# Patient Record
Sex: MALE | Race: WHITE | NOT HISPANIC OR LATINO | Employment: FULL TIME | URBAN - NONMETROPOLITAN AREA
[De-identification: names, ages, dates, MRNs, and addresses within clinical notes are randomized per-mention and may not be internally consistent; named-entity substitution may affect disease eponyms.]

---

## 2020-01-06 ENCOUNTER — HOSPITAL ENCOUNTER (EMERGENCY)
Facility: HOSPITAL | Age: 60
Discharge: HOME | End: 2020-01-06
Attending: NURSE PRACTITIONER | Admitting: NURSE PRACTITIONER
Payer: COMMERCIAL

## 2020-01-06 VITALS
SYSTOLIC BLOOD PRESSURE: 131 MMHG | DIASTOLIC BLOOD PRESSURE: 85 MMHG | TEMPERATURE: 96.5 F | RESPIRATION RATE: 14 BRPM | OXYGEN SATURATION: 96 %

## 2020-01-06 DIAGNOSIS — R21 RASH AND NONSPECIFIC SKIN ERUPTION: ICD-10-CM

## 2020-01-06 PROCEDURE — 99203 OFFICE O/P NEW LOW 30 MIN: CPT | Mod: Z6 | Performed by: NURSE PRACTITIONER

## 2020-01-06 PROCEDURE — G0463 HOSPITAL OUTPT CLINIC VISIT: HCPCS

## 2020-01-06 RX ORDER — PREDNISONE 20 MG/1
TABLET ORAL
Qty: 5 TABLET | Refills: 0 | Status: SHIPPED | OUTPATIENT
Start: 2020-01-06 | End: 2020-01-22

## 2020-01-06 ASSESSMENT — ENCOUNTER SYMPTOMS
ACTIVITY CHANGE: 1
GASTROINTESTINAL NEGATIVE: 1
NEUROLOGICAL NEGATIVE: 1
COLOR CHANGE: 1
FEVER: 0
RESPIRATORY NEGATIVE: 1
CHILLS: 0

## 2020-01-06 NOTE — DISCHARGE INSTRUCTIONS
Start taking loratadine (Claritan) 10 mg twice a day for ten days. You can hold the loratadine at night and take diphenhydramine (Benadryl 25 to 50 mg).   Hydrocortisone cream twice daily. Apply in a thin layer.  Return to be reevaluated if the rash worsens in the next 24 to 48 hours or if you develop fevers. Or chills

## 2020-01-06 NOTE — ED TRIAGE NOTES
"Pt is here today with c/o rash on \" all over body\" when I get home and warm up it flares up. When im outside and stay cool \"its not bad\". When it flares up it gets white bumps and becomes itchy. Pt reports trying Vaseline and cortisone and baby lotion with no relief.   "

## 2020-01-06 NOTE — ED PROVIDER NOTES
History     Chief Complaint   Patient presents with     Rash     c/o itchy rash     HPI  Alber Jernigan is a 59 year old male who presents with a itchy rash covering his arms, legs, chest, and back that started one week ago , patches, itching. This has been accompanied with episode of hot flashes. The rash prevents him from sleeping. He has been applying Vaseline and baby lotion to the rash with minimal relief form the discomfort. Denies fevers, chills, nausea, vomiting, diarrhea, and shortness of breath.    Accompanied per self  Length of illness one week  History of prior none  Medications or interventions for discomfort vaseline and baby lotion  Last dose of OTC none  Sick contacts none    Allergies:  Not on File    Problem List:    There are no active problems to display for this patient.       Past Medical History:    History reviewed. No pertinent past medical history.    Past Surgical History:    History reviewed. No pertinent surgical history.    Family History:    No family history on file.    Social History:  Marital Status:   [2]  Social History     Tobacco Use     Smoking status: None   Substance Use Topics     Alcohol use: None     Drug use: None        Medications:    predniSONE (DELTASONE) 20 MG tablet          Review of Systems   Constitutional: Positive for activity change. Negative for chills and fever.        Having episodes  Of hot flashes   Respiratory: Negative.    Gastrointestinal: Negative.    Skin: Positive for color change and rash.   Neurological: Negative.        Physical Exam   BP: 131/85  Heart Rate: 76  Temp: 96.5  F (35.8  C)  Resp: 14  SpO2: 96 %      Physical Exam  Vitals signs and nursing note reviewed.   Constitutional:       General: He is in acute distress.      Appearance: He is normal weight.   HENT:      Head: Normocephalic.   Cardiovascular:      Rate and Rhythm: Normal rate.   Pulmonary:      Effort: Pulmonary effort is normal.   Skin:     General: Skin is  warm and dry.      Findings: Erythema (mild erythema and blotchy.) present. No rash.   Neurological:      Mental Status: He is alert and oriented to person, place, and time.   Psychiatric:         Behavior: Behavior normal.         ED Course        Procedures               No results found for this or any previous visit (from the past 24 hour(s)).    Medications - No data to display    Assessments & Plan (with Medical Decision Making)     I have reviewed the nursing notes.    I have reviewed the findings, diagnosis, plan and need for follow up with the patient.  (R21) Rash and nonspecific skin eruption    Comment: 59 year old male who presents with discomfort from a skin rash that covers his body including, his arms, legs, chest, abdomen and back. The rash is preventing him from sleeping. Denies any new hygiene products, soaps, cleaning supplies, car, bedding, clothes, and furniture. Has not started any new medications. At the time of this exam, the rash was not present but his skin tone in mildly erythematous and blotchy/.  The rash bothers him mostly at night.    Plan: decreasing dose of prednisone prescribed and education concerning this medication provided. Start taking loratadine (Claritan) 10 mg (education provided) twice a day for ten days. You can hold the loratadine at night and take diphenhydramine (Benadryl 25 to 50 mg).   Hydrocortisone cream twice daily. Apply in a thin layer.  Be aware of foods that might be causing the rash.  Return to be reevaluated if the rash worsens in the next 24 to 48 hours or if you develop fevers or chills   These discharge instructions and medications were reviewed with him and understanding verbalized.            Discharge Medication List as of 1/6/2020 12:06 PM      START taking these medications    Details   predniSONE (DELTASONE) 20 MG tablet 1 tab daily for 3 days, then 1/2 tab daily for 4 days, Disp-5 tablet, R-0, E-Prescribe             Final diagnoses:   Rash and  nonspecific skin eruption       1/6/2020   [facility]       Marycarmen Medina, CNP  01/08/20 1541

## 2020-01-06 NOTE — ED AVS SNAPSHOT
[facility]  [address]  [place] [address]  Phone:  [phone]                                    Alber Jernigan   MRN: [id number]    Department:  [facility]   Date of Visit:  1/6/2020           After Visit Summary Signature Page    I have received my discharge instructions, and my questions have been answered. I have discussed any challenges I see with this plan with the nurse or doctor.    ..........................................................................................................................................  Patient/Patient Representative Signature      ..........................................................................................................................................  Patient Representative Print Name and Relationship to Patient    ..................................................               ................................................  Date                                   Time    ..........................................................................................................................................  Reviewed by Signature/Title    ...................................................              ..............................................  Date                                               Time          22EPIC Rev 08/18

## 2020-01-22 ENCOUNTER — APPOINTMENT (OUTPATIENT)
Dept: GENERAL RADIOLOGY | Facility: HOSPITAL | Age: 60
DRG: 914 | End: 2020-01-22
Attending: SURGERY
Payer: OTHER MISCELLANEOUS

## 2020-01-22 ENCOUNTER — APPOINTMENT (OUTPATIENT)
Dept: CT IMAGING | Facility: HOSPITAL | Age: 60
DRG: 914 | End: 2020-01-22
Attending: SURGERY
Payer: OTHER MISCELLANEOUS

## 2020-01-22 ENCOUNTER — APPOINTMENT (OUTPATIENT)
Dept: GENERAL RADIOLOGY | Facility: HOSPITAL | Age: 60
DRG: 914 | End: 2020-01-22
Attending: FAMILY MEDICINE
Payer: OTHER MISCELLANEOUS

## 2020-01-22 ENCOUNTER — HOSPITAL ENCOUNTER (INPATIENT)
Facility: HOSPITAL | Age: 60
LOS: 1 days | Discharge: HOME | DRG: 914 | End: 2020-01-23
Attending: FAMILY MEDICINE | Admitting: SURGERY
Payer: OTHER MISCELLANEOUS

## 2020-01-22 ENCOUNTER — APPOINTMENT (OUTPATIENT)
Dept: CT IMAGING | Facility: HOSPITAL | Age: 60
DRG: 914 | End: 2020-01-22
Attending: FAMILY MEDICINE
Payer: OTHER MISCELLANEOUS

## 2020-01-22 DIAGNOSIS — S87.81XA: Primary | ICD-10-CM

## 2020-01-22 DIAGNOSIS — T14.90XA TRAUMA: ICD-10-CM

## 2020-01-22 DIAGNOSIS — S87.81XA: ICD-10-CM

## 2020-01-22 LAB
ALBUMIN SERPL-MCNC: 3.6 G/DL (ref 3.4–5)
ALBUMIN UR-MCNC: NEGATIVE MG/DL
ALP SERPL-CCNC: 52 U/L (ref 40–150)
ALT SERPL W P-5'-P-CCNC: 37 U/L (ref 0–70)
ANION GAP SERPL CALCULATED.3IONS-SCNC: 6 MMOL/L (ref 3–14)
APPEARANCE UR: CLEAR
AST SERPL W P-5'-P-CCNC: 21 U/L (ref 0–45)
BASOPHILS # BLD AUTO: 0.1 10E9/L (ref 0–0.2)
BASOPHILS NFR BLD AUTO: 1.1 %
BILIRUB SERPL-MCNC: 0.2 MG/DL (ref 0.2–1.3)
BILIRUB UR QL STRIP: NEGATIVE
BUN SERPL-MCNC: 24 MG/DL (ref 7–30)
CALCIUM SERPL-MCNC: 8.4 MG/DL (ref 8.5–10.1)
CHLORIDE SERPL-SCNC: 107 MMOL/L (ref 94–109)
CK SERPL-CCNC: 241 U/L (ref 30–300)
CO2 SERPL-SCNC: 25 MMOL/L (ref 20–32)
COLOR UR AUTO: ABNORMAL
CREAT SERPL-MCNC: 1 MG/DL (ref 0.66–1.25)
DIFFERENTIAL METHOD BLD: NORMAL
EOSINOPHIL # BLD AUTO: 0.4 10E9/L (ref 0–0.7)
EOSINOPHIL NFR BLD AUTO: 4.3 %
ERYTHROCYTE [DISTWIDTH] IN BLOOD BY AUTOMATED COUNT: 13.1 % (ref 10–15)
GFR SERPL CREATININE-BSD FRML MDRD: 83 ML/MIN/{1.73_M2}
GLUCOSE BLDC GLUCOMTR-MCNC: 92 MG/DL (ref 70–99)
GLUCOSE SERPL-MCNC: 97 MG/DL (ref 70–99)
GLUCOSE UR STRIP-MCNC: NEGATIVE MG/DL
HCT VFR BLD AUTO: 40.8 % (ref 40–53)
HGB BLD-MCNC: 13.8 G/DL (ref 13.3–17.7)
HGB UR QL STRIP: NEGATIVE
IMM GRANULOCYTES # BLD: 0 10E9/L (ref 0–0.4)
IMM GRANULOCYTES NFR BLD: 0.2 %
INR PPP: 1.14 (ref 0.86–1.14)
KETONES UR STRIP-MCNC: NEGATIVE MG/DL
LEUKOCYTE ESTERASE UR QL STRIP: NEGATIVE
LYMPHOCYTES # BLD AUTO: 2.7 10E9/L (ref 0.8–5.3)
LYMPHOCYTES NFR BLD AUTO: 32 %
MCH RBC QN AUTO: 31.2 PG (ref 26.5–33)
MCHC RBC AUTO-ENTMCNC: 33.8 G/DL (ref 31.5–36.5)
MCV RBC AUTO: 92 FL (ref 78–100)
MONOCYTES # BLD AUTO: 0.6 10E9/L (ref 0–1.3)
MONOCYTES NFR BLD AUTO: 7.4 %
NEUTROPHILS # BLD AUTO: 4.6 10E9/L (ref 1.6–8.3)
NEUTROPHILS NFR BLD AUTO: 55 %
NITRATE UR QL: NEGATIVE
NRBC # BLD AUTO: 0 10*3/UL
NRBC BLD AUTO-RTO: 0 /100
PH UR STRIP: 7.5 PH (ref 4.7–8)
PLATELET # BLD AUTO: 285 10E9/L (ref 150–450)
POTASSIUM SERPL-SCNC: 4.4 MMOL/L (ref 3.4–5.3)
PROT SERPL-MCNC: 6.8 G/DL (ref 6.8–8.8)
RBC # BLD AUTO: 4.42 10E12/L (ref 4.4–5.9)
SODIUM SERPL-SCNC: 138 MMOL/L (ref 133–144)
SOURCE: ABNORMAL
SP GR UR STRIP: 1.04 (ref 1–1.03)
UROBILINOGEN UR STRIP-MCNC: NORMAL MG/DL (ref 0–2)
WBC # BLD AUTO: 8.3 10E9/L (ref 4–11)

## 2020-01-22 PROCEDURE — 36415 COLL VENOUS BLD VENIPUNCTURE: CPT | Performed by: SURGERY

## 2020-01-22 PROCEDURE — 73701 CT LOWER EXTREMITY W/DYE: CPT | Mod: TC,RT

## 2020-01-22 PROCEDURE — 73590 X-RAY EXAM OF LOWER LEG: CPT | Mod: TC,LT

## 2020-01-22 PROCEDURE — 99223 1ST HOSP IP/OBS HIGH 75: CPT | Performed by: SURGERY

## 2020-01-22 PROCEDURE — 73560 X-RAY EXAM OF KNEE 1 OR 2: CPT | Mod: TC,LT

## 2020-01-22 PROCEDURE — 71045 X-RAY EXAM CHEST 1 VIEW: CPT | Mod: TC

## 2020-01-22 PROCEDURE — 80053 COMPREHEN METABOLIC PANEL: CPT | Performed by: SURGERY

## 2020-01-22 PROCEDURE — 40000275 ZZH STATISTIC RCP TIME EA 10 MIN

## 2020-01-22 PROCEDURE — 73110 X-RAY EXAM OF WRIST: CPT | Mod: TC,RT

## 2020-01-22 PROCEDURE — 72193 CT PELVIS W/DYE: CPT | Mod: TC

## 2020-01-22 PROCEDURE — 73130 X-RAY EXAM OF HAND: CPT | Mod: TC,RT

## 2020-01-22 PROCEDURE — 81003 URINALYSIS AUTO W/O SCOPE: CPT | Performed by: SURGERY

## 2020-01-22 PROCEDURE — 25500064 ZZH RX 255 OP 636: Performed by: RADIOLOGY

## 2020-01-22 PROCEDURE — 73552 X-RAY EXAM OF FEMUR 2/>: CPT | Mod: TC,LT

## 2020-01-22 PROCEDURE — 00000146 ZZHCL STATISTIC GLUCOSE BY METER IP

## 2020-01-22 PROCEDURE — 99291 CRITICAL CARE FIRST HOUR: CPT | Mod: 25

## 2020-01-22 PROCEDURE — 12000000 ZZH R&B MED SURG/OB

## 2020-01-22 PROCEDURE — G0390 TRAUMA RESPONS W/HOSP CRITI: HCPCS

## 2020-01-22 PROCEDURE — 85610 PROTHROMBIN TIME: CPT | Performed by: SURGERY

## 2020-01-22 PROCEDURE — 73701 CT LOWER EXTREMITY W/DYE: CPT | Mod: TC,RT,XS

## 2020-01-22 PROCEDURE — 25000128 H RX IP 250 OP 636: Performed by: SURGERY

## 2020-01-22 PROCEDURE — 82550 ASSAY OF CK (CPK): CPT | Performed by: SURGERY

## 2020-01-22 PROCEDURE — 96376 TX/PRO/DX INJ SAME DRUG ADON: CPT

## 2020-01-22 PROCEDURE — 25000132 ZZH RX MED GY IP 250 OP 250 PS 637: Performed by: SURGERY

## 2020-01-22 PROCEDURE — 96374 THER/PROPH/DIAG INJ IV PUSH: CPT

## 2020-01-22 PROCEDURE — 85025 COMPLETE CBC W/AUTO DIFF WBC: CPT | Performed by: SURGERY

## 2020-01-22 PROCEDURE — 25000128 H RX IP 250 OP 636

## 2020-01-22 RX ORDER — NALOXONE HYDROCHLORIDE 0.4 MG/ML
.1-.4 INJECTION, SOLUTION INTRAMUSCULAR; INTRAVENOUS; SUBCUTANEOUS
Status: DISCONTINUED | OUTPATIENT
Start: 2020-01-22 | End: 2020-01-23 | Stop reason: HOSPADM

## 2020-01-22 RX ORDER — FENTANYL CITRATE 50 UG/ML
100 INJECTION, SOLUTION INTRAMUSCULAR; INTRAVENOUS ONCE
Status: DISCONTINUED | OUTPATIENT
Start: 2020-01-22 | End: 2020-01-23 | Stop reason: HOSPADM

## 2020-01-22 RX ORDER — ONDANSETRON 4 MG/1
4 TABLET, ORALLY DISINTEGRATING ORAL EVERY 6 HOURS PRN
Status: DISCONTINUED | OUTPATIENT
Start: 2020-01-22 | End: 2020-01-23 | Stop reason: HOSPADM

## 2020-01-22 RX ORDER — DIPHENHYDRAMINE HCL 25 MG
25 TABLET ORAL EVERY 6 HOURS PRN
COMMUNITY
End: 2023-06-25

## 2020-01-22 RX ORDER — IOPAMIDOL 612 MG/ML
100 INJECTION, SOLUTION INTRAVASCULAR ONCE
Status: COMPLETED | OUTPATIENT
Start: 2020-01-22 | End: 2020-01-22

## 2020-01-22 RX ORDER — ONDANSETRON 2 MG/ML
4 INJECTION INTRAMUSCULAR; INTRAVENOUS EVERY 6 HOURS PRN
Status: DISCONTINUED | OUTPATIENT
Start: 2020-01-22 | End: 2020-01-23 | Stop reason: HOSPADM

## 2020-01-22 RX ORDER — AMOXICILLIN 250 MG
1-2 CAPSULE ORAL 2 TIMES DAILY
Status: DISCONTINUED | OUTPATIENT
Start: 2020-01-22 | End: 2020-01-23 | Stop reason: HOSPADM

## 2020-01-22 RX ORDER — FENTANYL CITRATE 50 UG/ML
100 INJECTION, SOLUTION INTRAMUSCULAR; INTRAVENOUS ONCE
Status: COMPLETED | OUTPATIENT
Start: 2020-01-22 | End: 2020-01-22

## 2020-01-22 RX ORDER — LIDOCAINE 40 MG/G
CREAM TOPICAL
Status: DISCONTINUED | OUTPATIENT
Start: 2020-01-22 | End: 2020-01-23 | Stop reason: HOSPADM

## 2020-01-22 RX ORDER — FENTANYL CITRATE 50 UG/ML
INJECTION, SOLUTION INTRAMUSCULAR; INTRAVENOUS
Status: COMPLETED
Start: 2020-01-22 | End: 2020-01-22

## 2020-01-22 RX ORDER — HYDROCODONE BITARTRATE AND ACETAMINOPHEN 7.5; 325 MG/1; MG/1
1 TABLET ORAL EVERY 6 HOURS PRN
Status: DISCONTINUED | OUTPATIENT
Start: 2020-01-22 | End: 2020-01-23 | Stop reason: HOSPADM

## 2020-01-22 RX ORDER — PROCHLORPERAZINE 25 MG
25 SUPPOSITORY, RECTAL RECTAL EVERY 12 HOURS PRN
Status: DISCONTINUED | OUTPATIENT
Start: 2020-01-22 | End: 2020-01-23 | Stop reason: HOSPADM

## 2020-01-22 RX ORDER — PROCHLORPERAZINE MALEATE 10 MG
10 TABLET ORAL EVERY 6 HOURS PRN
Status: DISCONTINUED | OUTPATIENT
Start: 2020-01-22 | End: 2020-01-23 | Stop reason: HOSPADM

## 2020-01-22 RX ORDER — KETOROLAC TROMETHAMINE 30 MG/ML
30 INJECTION, SOLUTION INTRAMUSCULAR; INTRAVENOUS EVERY 6 HOURS
Status: DISCONTINUED | OUTPATIENT
Start: 2020-01-22 | End: 2020-01-23 | Stop reason: HOSPADM

## 2020-01-22 RX ORDER — ACETAMINOPHEN 325 MG/1
975 TABLET ORAL 3 TIMES DAILY
Status: DISCONTINUED | OUTPATIENT
Start: 2020-01-22 | End: 2020-01-23 | Stop reason: HOSPADM

## 2020-01-22 RX ADMIN — FENTANYL CITRATE 100 MCG: 50 INJECTION, SOLUTION INTRAMUSCULAR; INTRAVENOUS at 15:36

## 2020-01-22 RX ADMIN — IOPAMIDOL 100 ML: 612 INJECTION, SOLUTION INTRAVENOUS at 16:05

## 2020-01-22 RX ADMIN — ENOXAPARIN SODIUM 30 MG: 30 INJECTION SUBCUTANEOUS at 20:22

## 2020-01-22 RX ADMIN — ACETAMINOPHEN 975 MG: 325 TABLET, FILM COATED ORAL at 21:46

## 2020-01-22 RX ADMIN — HYDROCODONE BITARTRATE AND ACETAMINOPHEN 1 TABLET: 7.5; 325 TABLET ORAL at 20:18

## 2020-01-22 RX ADMIN — KETOROLAC TROMETHAMINE 30 MG: 30 INJECTION, SOLUTION INTRAMUSCULAR; INTRAVENOUS at 19:27

## 2020-01-22 RX ADMIN — FENTANYL CITRATE 100 MCG: 50 INJECTION, SOLUTION INTRAMUSCULAR; INTRAVENOUS at 17:40

## 2020-01-22 ASSESSMENT — ACTIVITIES OF DAILY LIVING (ADL): ADLS_ACUITY_SCORE: 11

## 2020-01-22 NOTE — ED NOTES
Return from XR with Nurse. Pain 4/10. Wife at bedside. CMS remains intact, swelling at shin unchanged.

## 2020-01-22 NOTE — ED NOTES
This nurse called Dr. Allison for PRNs for pt until disposition selected. MD will call back when finished with occupying task, currently in ICU with critical patient.

## 2020-01-22 NOTE — ED PROVIDER NOTES
History     Chief Complaint   Patient presents with     Trauma     right leg and right hand crushed by 1000 pound rubber roll     HPI  Alber Jernigan is a 59 year old man who presents by EMS after he was working at [employer] and sustained a crush injury to his right lower extremity. The patient states that he tried to squeeze between two 1 ton rubber rolls, but didn't make it. One of the rolls fell onto his right knee and lower leg. His right hand was also pinched during the accident. He was able to self-extricate. He did not his his head or have a loss of consciousness. He did not sustain any other injury during the fall.    Allergies:  Not on File    Problem List:    There are no active problems to display for this patient.       Past Medical History:    History reviewed. No pertinent past medical history.    Past Surgical History:    History reviewed. No pertinent surgical history.    Family History:    No family history on file.    Social History:  Marital Status:   [2]  Social History     Tobacco Use     Smoking status: None   Substance Use Topics     Alcohol use: None     Drug use: None        Medications:    No current outpatient medications on file.        Review of Systems   Unable to perform ROS: Acuity of condition   Care transferred to Dr. Allison prior to Review of Systems.    Physical Exam          Physical Exam    Primary Survey:    A - airway patent    B - breath sounds equal bilaterally    C - circulation intact distally    D - GCS 15 (E: 4, V: 5, M: 6), no apparent disability    E - patient fully exposed to evaluate for injury    No pelvic instability. 2+/4+ right tibal and pedal pulses palpated.      Secondary Survey:    Dr. Allison, [facility], and assumes primary care for the patient at this time.    ED Course     1528  Level 1 trauma called while EMS en route after initial report of crush injury. Patient's care transferred to Dr. Allison at 1528.       Procedures                  Trauma Summary Disposition     Patient is trauma admission:  TTA      Spine  Backboard removal time: Backboard not applied   C-collar and immobilization: not indicated, cleared.  CSpine Clearance: by Nexus Criteria and by [employer] C spine rules  Full Primary and Secondary survey with appropriate immobilization of spine completed in exam section.     Neuro  GCS at arrival:  Motor 6=Obeys commands   Verbal 5=Oriented   Eye Opening 4=Spontaneous   Total: 15            No results found for this or any previous visit (from the past 24 hour(s)).    Medications   fentaNYL (PF) (SUBLIMAZE) 100 MCG/2ML injection (100 mcg  Given 1/22/20 1536)       Assessments & Plan (with Medical Decision Making)   The patient is a 59 year old man who sustained a right lower extremity crush injury.    1) RLE crush injury - Dr. Allison assumed care at 1528. Please refer to his documentation for further details of the patient's evaluation and treatment.      I have reviewed the nursing notes.    I have reviewed the findings, diagnosis, plan and need for follow up with the patient.    New Prescriptions    No medications on file       Final diagnoses:   Crushing injury of multiple sites of right lower extremity, initial encounter       1/22/2020   [facility]     Koko Dominguez MD  01/22/20 6782

## 2020-01-22 NOTE — ED NOTES
"Patient resting in bed with wife at bedside. States \"my leg is killing me.\" Rates pain 7/10. Vitals stable as charted. Call to Dr. Allison at this time and he gave a telephone order to give Fentanyl 100 mcg IV x 1.   "

## 2020-01-23 ENCOUNTER — APPOINTMENT (OUTPATIENT)
Dept: MRI IMAGING | Facility: HOSPITAL | Age: 60
DRG: 914 | End: 2020-01-23
Attending: SURGERY
Payer: OTHER MISCELLANEOUS

## 2020-01-23 ENCOUNTER — APPOINTMENT (OUTPATIENT)
Dept: PHYSICAL THERAPY | Facility: HOSPITAL | Age: 60
DRG: 914 | End: 2020-01-23
Attending: SURGERY
Payer: OTHER MISCELLANEOUS

## 2020-01-23 VITALS
HEART RATE: 74 BPM | WEIGHT: 212.08 LBS | OXYGEN SATURATION: 97 % | RESPIRATION RATE: 20 BRPM | BODY MASS INDEX: 28.11 KG/M2 | TEMPERATURE: 97.7 F | SYSTOLIC BLOOD PRESSURE: 142 MMHG | DIASTOLIC BLOOD PRESSURE: 74 MMHG | HEIGHT: 73 IN

## 2020-01-23 LAB — CK SERPL-CCNC: 157 U/L (ref 30–300)

## 2020-01-23 PROCEDURE — 25000128 H RX IP 250 OP 636: Performed by: SURGERY

## 2020-01-23 PROCEDURE — 73721 MRI JNT OF LWR EXTRE W/O DYE: CPT | Mod: TC,RT

## 2020-01-23 PROCEDURE — 97116 GAIT TRAINING THERAPY: CPT | Mod: GP | Performed by: PHYSICAL THERAPIST

## 2020-01-23 PROCEDURE — 99238 HOSP IP/OBS DSCHRG MGMT 30/<: CPT | Performed by: SURGERY

## 2020-01-23 PROCEDURE — 82550 ASSAY OF CK (CPK): CPT | Performed by: SURGERY

## 2020-01-23 PROCEDURE — 40000275 ZZH STATISTIC RCP TIME EA 10 MIN

## 2020-01-23 PROCEDURE — 97162 PT EVAL MOD COMPLEX 30 MIN: CPT | Mod: GP | Performed by: PHYSICAL THERAPIST

## 2020-01-23 PROCEDURE — 36415 COLL VENOUS BLD VENIPUNCTURE: CPT | Performed by: SURGERY

## 2020-01-23 PROCEDURE — 25000132 ZZH RX MED GY IP 250 OP 250 PS 637: Performed by: SURGERY

## 2020-01-23 RX ORDER — AMOXICILLIN 250 MG
1-2 CAPSULE ORAL 2 TIMES DAILY
Qty: 30 TABLET | Refills: 1 | Status: SHIPPED | OUTPATIENT
Start: 2020-01-23

## 2020-01-23 RX ORDER — HYDROCODONE BITARTRATE AND ACETAMINOPHEN 7.5; 325 MG/1; MG/1
1 TABLET ORAL EVERY 6 HOURS PRN
Qty: 20 TABLET | Refills: 0 | Status: SHIPPED | OUTPATIENT
Start: 2020-01-23 | End: 2023-06-25

## 2020-01-23 RX ADMIN — ACETAMINOPHEN 975 MG: 325 TABLET, FILM COATED ORAL at 08:49

## 2020-01-23 RX ADMIN — KETOROLAC TROMETHAMINE 30 MG: 30 INJECTION, SOLUTION INTRAMUSCULAR; INTRAVENOUS at 01:39

## 2020-01-23 RX ADMIN — KETOROLAC TROMETHAMINE 30 MG: 30 INJECTION, SOLUTION INTRAMUSCULAR; INTRAVENOUS at 07:15

## 2020-01-23 RX ADMIN — ONDANSETRON 4 MG: 4 TABLET, ORALLY DISINTEGRATING ORAL at 07:15

## 2020-01-23 RX ADMIN — ENOXAPARIN SODIUM 30 MG: 30 INJECTION SUBCUTANEOUS at 08:51

## 2020-01-23 RX ADMIN — HYDROCODONE BITARTRATE AND ACETAMINOPHEN 1 TABLET: 7.5; 325 TABLET ORAL at 10:11

## 2020-01-23 ASSESSMENT — ACTIVITIES OF DAILY LIVING (ADL)
ADLS_ACUITY_SCORE: 12
ADLS_ACUITY_SCORE: 14
ADLS_ACUITY_SCORE: 12
ADLS_ACUITY_SCORE: 12

## 2020-01-23 ASSESSMENT — MIFFLIN-ST. JEOR: SCORE: 1840.88

## 2020-01-23 NOTE — PLAN OF CARE
"Reason for hospital stay:  Crushing Injury  Living situation PTA: Home with wife  Most recent vitals: /74   Pulse 74   Temp 97.7  F (36.5  C) (Tympanic)   Resp 20   Ht 1.854 m (6' 1\")   Wt 96.2 kg (212 lb 1.3 oz)   SpO2 97%   BMI 27.98 kg/m      Pain Management:  Pain has been between 5-7/10. Has received Norco x 1, scheduled Toradol and acetaminophen.  LOC:  A&O x 4  Cardiac:  WDL  Respiratory:  Clear in all bases.   GI:  Bowel sounds active x 4.   :  Voiding without difficulty  Skin Issues:  No bruising or deformities noted. No open areas noted.     IVF:  SL    Nutrition: Regular  ADL's:  Assit x 1  Ambulation:Assist x 2, leg stabilizer to right leg. Walker. Ambulated with PT        Comments:      1/23/2020  12:58 PM  Cheryle Long RN    "

## 2020-01-23 NOTE — PLAN OF CARE
[facility] Inpatient Admission Note:    Patient admitted to 3106/3106-1 at approximately 1845via cart accompanied by transport tech from emergency room . Report received from Maria De Jesus in SBAR format at 1830 via telephone. Patient transferred to bed via slide board.. Patient is alert and oriented X 3, reports pain; rates at 5 on 0-10 scale.  Patient oriented to room, unit, hourly rounding, and plan of care. Explained admission packet and patient handbook with patient bill of rights brochure. Will continue to monitor and document as needed.     Inpatient Nursing criteria listed below was met:    Health care directives status obtained and documented: Yes    Care Everywhere authorization obtained No    MRSA swab completed for patient 65 years and older: N/A    Patient identifies a surrogate decision maker: No If yes, who: Contact Information:     If initial lactic acid >2.0, repeat lactic acid drawn within one hour of arrival to unit: NA. If no, state reason:     Vaccination assessment and education completed: Yes   Vaccinations received prior to admission: Pneumovax no  Influenza(seasonal)  NO   Vaccination(s) ordered: patient declines    Clergy visit ordered if patient requests: N/A    Skin issues/needs documented: Yes    Isolation Patient: no Education given, correct sign in place and documentation row added to PCS:      Fall Prevention Yes: Care plan updated, education given and documented, sticker and magnet in place: Yes    Care Plan initiated: Yes    Education Documented (including assessment): Yes    Patient has discharge needs : No If yes, please explain:    A&O. VSS/AF Reports 5/10 pain to RLE from knee to ankle, medial Left knee and Right hand, particularly first 2 digits. Denies nausea. Small abrasion noted lateral Rt knee and Rt hand. Endorses tingling to Rt foot ankle to toes; this resolves later in shift as ROM is increased as tolerates. Difficulty raising RLE but this is also improving. Dr Allison at  bedside to see patient; discuss oxycodone allergy, has hydrocodone ordered PRN. Orders to give, monitor for s/s of reaction and call in 1 hour. Tolerates with no difficulty, VS remain stable with adequate pain control achieved. Dr Allison contacted; message left.   Face to face report given with opportunity to observe patient.    Report given to Nicola Jiang RN   1/22/2020  11:38 PM

## 2020-01-23 NOTE — DISCHARGE SUMMARY
Physician Discharge Summary     Patient ID:  Alber Jernigan  [id number]  59 year old  1960    Admit date: 1/22/2020    Discharge date and time: 1/23/2020     Admitting Physician: Brian Allison DO, FACS    Discharge Physician: Brian Allison DO, FACS    Admission Diagnoses: Trauma [T14.90XA]  Crushing injury of multiple sites of right lower extremity, initial encounter [S87.81XA]    Discharge Diagnoses: Right knee medial meniscus tear, Right knee lateral meniscus tear, Right vastus lateralis myotendinous tear, Right Lateral patellar retinaculum tear, Right medial collateral ligament tear, Partial right anterior cruciate ligament, Right tibial plateau impaction fracture    Admission Condition: fair    Discharged Condition: good    Indication for Admission: Level 1 trauma from a crush injury sustained to the right lower extremity.  Initial evaluation was only significant for pain right lower extremity, Right wrist and left knee.  Was admitted for pain control and further evaluation    Hospital Course: Patient presented as a level 1 trauma from EMS after a work place accident resulting in a 1/2 ton roll of rubber striking Mr. Jernigan in the right hand and right lower extremity.  He was briefly pined between the roll and what sounds like a pillar.  He was not stuck under the roll in any manner.  He underwent ATLS trauma evaluation as well as x-rays of the right wrist, right hand, Left femur, left tib/fib and CT right lower extremity.  No bone or muscle injuries were found.  There was no intra-compartment hematoma, concern for compartment syndrome. No concern for neurovascular compromise.  He was admitted for pain control and further revaluation.  There was no clinical change in regards to developing compartment syndrome.  His initial CK and subsequent CK were normal.  He was further evaluated with MR of the right knee with the above ligamentous injuries.  PT was consulted.  He was placed in knee  immobilizer.  He was educated on how to mobilize with the immobilizer.  Dr. Coppola was consulted and will see the patient as an outpatient early next week.    Consults: orthopedic surgery    Significant Diagnostic Studies: radiology: MRI: Exam:MR KNEE RIGHT W/O CONTRAST     History:59 years Male  with trauma related right knee pain. There is a  joint effusion. Patient is unable to walk     Comparisons: Plain films dated 1/22/2020     Technique: Sagittal and PD fat sat, sagittal T2, coronal PD fat-sat,   coronal T1, axial PD fat-sat and axial PD imaging of the right knee  was performed.     Findings:     Fluid: A moderate sized joint effusion is present.     Medial Compartment:          Meniscus: There is a horizontally oriented tear of the  posterior horn and midportion with some associated mucoid degeneration  in the midportion. There is inner margin tearing of the midportion.          Cartilage: There is cartilage thinning of mild severity.     Lateral Compartment:         Meniscus: There is a tear involving the anterior root and inner  margin of the anterior horn. The midportion and posterior horn of are  intact.         Cartilage: Cartilage thickness and signal are normal.     Patellofemoral Compartment:           Cartilage thickness and signal are well-maintained.     Ligaments:           There is disruption of the lateral patellar retinaculum,  possibly in continuity with a myotendinous tear of the vastus  lateralis. The upper extent of this injury is not imaged. The  iliotibial band is intact.     There is increased T2 signal and some irregularity of the anterior  cruciate ligament. The posterior cruciate ligament is intact.     There is disruption of the femoral attachment of the medial collateral  ligament see series 7 image 13.     The iliotibial band is intact. The biceps femoris is intact. The  fibular collateral ligament is intact.     Soft tissues:          There is subcutaneous edema of the lateral and  anterior knee.     Osseous:          There is an osteochondral fracture at the lateral corner of the  lateral tibial plateau with slight impaction of the articular surface.  See series 7 image 17.                                                                         Impression:     Complete tear of the medial collateral ligament at its femoral  insertion. There is at least a partial tear of the anterior cruciate  ligament. Intact ACL fibers are visible.     Disruption of the lateral patellar retinaculum likely combined with  myotendinous injury of the vastus lateralis.     There are medial and lateral meniscal tears as described above.     Osteochondral fracture at the lateral corner of the lateral tibial  plateau. The articular surface is mildly impacted.     There is a moderate-sized joint effusion. Soft tissue edema is  present.    Treatments: IV hydration and analgesia: Vicodin    Discharge Exam:  Temp: 97.7  F (36.5  C) Temp  Min: 96.7  F (35.9  C)  Max: 98  F (36.7  C)  Resp: 20 Resp  Min: 5  Max: 22  SpO2: 97 % SpO2  Min: 94 %  Max: 100 %  Heart Rate: 71 Heart Rate  Min: 71  Max: 91  BP: 142/74 Systolic (24hrs), Av , Min:121 , Max:165   Diastolic (24hrs), Av, Min:74, Max:145  Gen: AAOx4, NAD, sitting up in bed eating breakfast  Abd: Soft, ND/NT no rebound, no guarding  LE: swollen Right knee, swollen medial aspect of left knee,4+ DP B/L, Quadriceps muscle compartment soft, Posterior calf soft, no ecchymosis, no hematoma.      Disposition: home    Patient Instructions:   Current Discharge Medication List      START taking these medications    Details   HYDROcodone-acetaminophen (NORCO) 7.5-325 MG per tablet Take 1 tablet by mouth every 6 hours as needed for severe pain  Qty: 20 tablet, Refills: 0    Associated Diagnoses: Crushing injury of right leg, initial encounter      senna-docusate (SENOKOT-S/PERICOLACE) 8.6-50 MG tablet Take 1-2 tablets by mouth 2 times daily Take while on narcotics.  Hold  for loose stools  Qty: 30 tablet, Refills: 1    Associated Diagnoses: Crushing injury of right leg, initial encounter         CONTINUE these medications which have NOT CHANGED    Details   diphenhydrAMINE (BENADRYL) 25 MG tablet Take 25 mg by mouth every 6 hours as needed for itching or allergies           Activity: activity as tolerated and as directed by physical therapy  Diet: regular diet  Wound Care: none needed    Follow-up with Dr. Coppola in next week.    Signed:  Brian Allison FACS, DO  1/23/2020  12:07 PM

## 2020-01-23 NOTE — PROGRESS NOTES
Assessment completed see flowsheet.    LOC: alert and oriented, pleasant and conversational  Others present: Patient and his wife Paloma    Dx: crush injury of the leg    Lives with: his wife Paloma  Living at:  Home in [place]  Transportation: YES They normally both drive    Primary PCP: No Ref-Primary, Physician; they recently moved to the area and haven't chosen a primary yet. They were provided a list of [facility] providers who are accepting new patients and the contact information for the other [facility] clinics  Insurance:  Work man's comp    Support System:  Paloma  Homecare/PCA: none  /[facility] Services:   none  [place]: NO      How was the [facility] notification completed: na    Health Care Directive: NO after discussion, they are comfortable not having a HCD  Guardian: jazmin  POA: na    Adequate Resources for needs (housing, utilities, food/med): YES, we reviewed area support agencies as they voiced that sometimes money is tight  Household chores: independent  Work/community/social activity: YES independent as desired    ADLs: independent; he expects to remain mostly independent after discharge as well  Ambulation:independent previously; plans to use crutches; feels confident he will be able to ascend/descend the steps to his home    Barriers: none known    DIANA: low    Plan: he plans to return home, does not expect to have any additional support needs

## 2020-01-23 NOTE — H&P
Surgery Consult Note      : 1960    ABEBA: 2020      Chief Complaint:  Level 1 trauma    History of Present Illness:  Mr. Jernigan is a very pleasant 59 year old male, who I am seeing at the request of Dr. Dominguez for evaluation of trauma. Level one trauma called because of concern for crush extremity injury.  I received the call at 1520 and was present about 1525.  Dr. Dominguez was in the process of completing the primary survey.  No immediately life threatening injuries were Identified.  Patient was hemodynamically stable, ventilating, protecting is air way.  EMS only provided some narcotics for pain and placed the right lower extremity in a splint.  Patient was working at the [employer] when a large roll of rubber broke free and swung down knocking him off his feet.  The roll weighs over 1000 lbs and swung from a height of 4 feet.  It first hit a table, the bounced and knocked his left leg, then his right leg.  He was not pinned under the roll.  He's complaining of right wrist and hand pain as well as right lower extremity pain.     Pre hospital information    PRIMARY:  A: Patent, patient protecting  B: CTA B/L, equal chest rise  C: No external bleeding, 4+ right radial pulse  D: GCS 15, answers questions appropriately, moving all extremities, eyes open  E: Clothes were cut away and warming blankets were applied    SECONDARY:   Head: Normal cephalic, atraumatic  EYE: pupils 4mm PERRL, EMOI  Ears: TMI, no hemotympanum   Nares: clear  Mouth: no blood in oral pharynx or missing teeth  Face: no sinus tenderness or zygomatic instability  Neck: no supraclavicular crepitus, no JVD, trachea midline  Chest: No chest wall deformity, no chest wall pain, no obvious bruising or evidence of external trauma to the chest wall.  CTA b/l  Heart:: RRR  Abd: Soft, ND/NT no rebound, no guarding, pelvis stable  UE: No long bone deformity, No obvious deformity.  tender to palpation right wrist, 2nd, 3rd, 4th finger.  MS 3/5 Right  hand.  Sensation intact, Left upper extremity unremarkable  LE: No long bone deformity, Right knee tender with edema, tender Tib/fib without obvious deformity, MS 3/5 right lower extremity.  There's no obvious external trauma to the right lower extremity.  4 + right DP, femoral.  There's no obvious hematoma or ecchymosis right lower extremity.  Muscle compartments soft.  Sensation preserved  Left lower extremity unremarkable.    Spine: No step offs, no midline tenderness  Rectal: good sphincter tone, no bleed, stool on finger    Medical history:  History reviewed. No pertinent past medical history.    Surgical history:  History reviewed. No pertinent surgical history.    Family history:  No family history on file.    Medications:  Prior to Admission medications    Medication Sig Start Date End Date Taking? Authorizing Provider   diphenhydrAMINE (BENADRYL) 25 MG tablet Take 25 mg by mouth every 6 hours as needed for itching or allergies   Yes Reported, Patient       Allergies:  The patientis allergic to oxycodone.  .  Social history:  Social History     Tobacco Use     Smoking status: Not on file   Substance Use Topics     Alcohol use: Not on file     Marital status: .    Review of Systems:    Constitutional: Negative for fever, chills and weight loss.   HENT: Negative for ear pain, nosebleeds, congestion, sore throat, tinnitus and ear discharge.    Eyes: Negative for blurred vision, double vision, photophobia and pain.   Respiratory: Negative for cough, hemoptysis, shortness of breath, wheezing and stridor.    Cardiovascular: Negative for chest pain, palpitations and orthopnea.   Gastrointestinal: Negative for heartburn, nausea, vomiting, abdominal pain and blood in stool.   Genitourinary: Negative for urgency, frequency and hematuria.   Musculoskeletal: Negative for myalgias, back pain and joint pain.   Neurological: Negative for tingling, speech change and headaches.   Endo/Heme/Allergies: Does not  bruise/bleed easily.   Psychiatric/Behavioral: Negative for depression, suicidal ideas and hallucinations. The patient is not nervous/anxious.    Results for orders placed or performed during the hospital encounter of 01/22/20 (from the past 24 hour(s))   Glucose by meter   Result Value Ref Range    Glucose 92 70 - 99 mg/dL   XR Chest Port 1 View    Narrative    PROCEDURE:  XR CHEST PORT 1 VW    HISTORY:  trauma; Trauma.     COMPARISON:  None.    FINDINGS:   The cardiac silhouette is normal in size. The pulmonary vasculature is  normal.  The lungs are clear. No pleural effusion or pneumothorax.      Impression    IMPRESSION:  No acute cardiopulmonary disease.      KAMALA AQUINO MD   CK total   Result Value Ref Range    CK Total 241 30 - 300 U/L   CBC with platelets differential   Result Value Ref Range    WBC 8.3 4.0 - 11.0 10e9/L    RBC Count 4.42 4.4 - 5.9 10e12/L    Hemoglobin 13.8 13.3 - 17.7 g/dL    Hematocrit 40.8 40.0 - 53.0 %    MCV 92 78 - 100 fl    MCH 31.2 26.5 - 33.0 pg    MCHC 33.8 31.5 - 36.5 g/dL    RDW 13.1 10.0 - 15.0 %    Platelet Count 285 150 - 450 10e9/L    Diff Method Automated Method     % Neutrophils 55.0 %    % Lymphocytes 32.0 %    % Monocytes 7.4 %    % Eosinophils 4.3 %    % Basophils 1.1 %    % Immature Granulocytes 0.2 %    Nucleated RBCs 0 0 /100    Absolute Neutrophil 4.6 1.6 - 8.3 10e9/L    Absolute Lymphocytes 2.7 0.8 - 5.3 10e9/L    Absolute Monocytes 0.6 0.0 - 1.3 10e9/L    Absolute Eosinophils 0.4 0.0 - 0.7 10e9/L    Absolute Basophils 0.1 0.0 - 0.2 10e9/L    Abs Immature Granulocytes 0.0 0 - 0.4 10e9/L    Absolute Nucleated RBC 0.0    Comprehensive metabolic panel   Result Value Ref Range    Sodium 138 133 - 144 mmol/L    Potassium 4.4 3.4 - 5.3 mmol/L    Chloride 107 94 - 109 mmol/L    Carbon Dioxide 25 20 - 32 mmol/L    Anion Gap 6 3 - 14 mmol/L    Glucose 97 70 - 99 mg/dL    Urea Nitrogen 24 7 - 30 mg/dL    Creatinine 1.00 0.66 - 1.25 mg/dL    GFR Estimate 83 >60  mL/min/[1.73_m2]    GFR Estimate If Black >90 >60 mL/min/[1.73_m2]    Calcium 8.4 (L) 8.5 - 10.1 mg/dL    Bilirubin Total 0.2 0.2 - 1.3 mg/dL    Albumin 3.6 3.4 - 5.0 g/dL    Protein Total 6.8 6.8 - 8.8 g/dL    Alkaline Phosphatase 52 40 - 150 U/L    ALT 37 0 - 70 U/L    AST 21 0 - 45 U/L   INR   Result Value Ref Range    INR 1.14 0.86 - 1.14   CT Femur Thigh Right w Contrast    Narrative    PROCEDURE: CT FEMUR THIGH RIGHT WITH CONTRAST 1/22/2020 4:18 PM    HISTORY: Polytrauma, critical, lower ext injury suspected    COMPARISONS: None.    Meds/Dose Given: Isovue 300 100 Ml    TECHNIQUE: CT scan of the right femur with sagittal coronal  reconstructions.    FINDINGS: No fractures of the femur are seen. The acetabulum is  intact. There is no knee effusion. Small amount of edema in the  prepatellar soft tissues.         Impression    IMPRESSION: No thigh hematoma. No bony fractures.    KAMALA AQUINO MD   CT Pelvis Bone w Contrast    Narrative    PROCEDURE: CT PELVIS BONE W CONTRAST 1/22/2020 4:19 PM    HISTORY: rle crush injury    COMPARISONS: None.    Meds/Dose Given: Isovue 300 100 Ml    TECHNIQUE: CT scan of the pelvis with IV contrast sagittal coronal  reconstructions were obtained    FINDINGS: No free fluid is seen in the pelvis. The appendix was  visualized and appears normal. No intraperitoneal abnormalities are  noted. The bladder and rectum are normal. There are no pelvic  fractures. Mild degenerative changes are seen in the sacroiliac  joints. Degenerative changes are present in the lower lumbar spine.  Both acetabula both proximal femurs appear intact.         Impression    IMPRESSION: No acute pelvic abnormality.    KAMALA AQUINO MD   CT Tibia Fibula Lower Leg Right w Contrast    Narrative    PROCEDURE: CT TIBIA FIBULA LOWER LEG RIGHT W CONTRAST 1/22/2020 4:19  PM    HISTORY: RLE crush injury    COMPARISONS: None.    Meds/Dose Given: Isovue 300 100 Ml    TECHNIQUE: CT scan of the tibia and fibula on  the right with sagittal  coronal reconstructions    FINDINGS: There is no tibial or fibular acute fracture. There is a  small calcific density anterior to the tibial spines. There is a small  amount of chondrocalcinosis seen at the medial femoral tibial  articulation. There has been internal fixation of the distal fibula  with a sideplate and screws. This fracture appears healed.  Posttraumatic arthritic change seen at the tibiotalar joint with some  chondral cystic change. Soft tissues of the calf appear normal. No  calf hematoma is seen.         Impression    IMPRESSION: No acute tibial or fibular fracture.    KAMALA AQUINO MD   XR Wrist Right G/E 3 Views    Narrative    XR WRIST RT G/E 3 VW    HISTORY: 59 years Male Right hand injury    COMPARISON: None    TECHNIQUE: Right wrist 3 views    FINDINGS: 3 views right wrist were obtained. Joint spaces are  congruent. Articular surfaces are smooth. There is no evidence of  fracture. There is degenerative osteoarthritic change of the scaphoid  trapezium trapezoid articulation.      Impression    IMPRESSION: No evidence of fracture or dislocation of the right wrist.    CHARO GAYTAN MD   XR Femur Left 2 Views    Narrative    XR FEMUR LT 2 VW    HISTORY: 59 years Male trauma    COMPARISON: None    TECHNIQUE: 5 views left femur    FINDINGS: The left femur is intact. There is no evidence of fracture  or dislocation.      Impression    IMPRESSION: Negative study.    CHARO GAYTAN MD   XR Tibia & Fibula Left 2 Views    Narrative    XR TIBIA & FIBULA LT 2 VW    HISTORY: 59 years Male trauma    COMPARISON: None    TECHNIQUE: 2 views left tibia and fibula    FINDINGS: The tibia and fibula are intact. There is no evidence of  fracture.      Impression    IMPRESSION: Negative study.    CHARO GAYTAN MD   XR Knee Left 1/2 Views    Narrative    XR KNEE LT 1/2 VW    HISTORY: 59 years Male trauma an left knee pain    COMPARISON: None    TECHNIQUE: 2 views left  knee    FINDINGS: There is medial compartment joint space narrowing. There is  no evidence of fracture or dislocation. There is no significant joint  effusion. There is subcutaneous edema of the anterior knee.      Impression    IMPRESSION: No evidence of fracture or dislocation.    Anterior soft tissue edema of the knee.    Medial compartment osteoarthritic change of moderate severity.    CHARO GAYTAN MD   XR Hand Right G/E 3 Views    Narrative    XR HAND RT G/E 3 VW    HISTORY: 59 years Male crush injury to right hand    COMPARISON: None    TECHNIQUE:   Right hand 3 views    FINDINGS: Joint spaces are congruent. Articular surfaces are smooth.  There is no evidence of fracture or dislocation.      Impression    IMPRESSION: No evidence of fracture or dislocation right hand.    CHARO GAYTAN MD   UA reflex to Microscopic and Culture   Result Value Ref Range    Color Urine Light Yellow     Appearance Urine Clear     Glucose Urine Negative NEG^Negative mg/dL    Bilirubin Urine Negative NEG^Negative    Ketones Urine Negative NEG^Negative mg/dL    Specific Gravity Urine 1.041 (H) 1.003 - 1.035    Blood Urine Negative NEG^Negative    pH Urine 7.5 4.7 - 8.0 pH    Protein Albumin Urine Negative NEG^Negative mg/dL    Urobilinogen mg/dL Normal 0.0 - 2.0 mg/dL    Nitrite Urine Negative NEG^Negative    Leukocyte Esterase Urine Negative NEG^Negative    Source Midstream Urine      A/P  #1 Crush injury  #2 Right lower extremity pain  #3 Right wrist pain  #4 Right hand pain    Will admit for IVF, pain control, tertiary exam and to ensure no compartment syndrome develops.  Patient non toxic, appears not to be in critical condition but the mechanism concerning for rhabdomyolysis and risk for compartment syndrome. Will need PT.  Repeat CK in AM.

## 2020-01-23 NOTE — ED NOTES
Dr. Allison in CT and states plan is to admit patient for monitoring of injuries pending CT results. Dr. Allison states he will be going over CT with Dr. Sun and will update us on disposition plan.

## 2020-01-23 NOTE — PROGRESS NOTES
Inpatient Physical Therapy Evaluation    Name: Alber Jernigan MRN# [id number]   Age: 59 year old YOB: 1960     Date of Consultation: 2020  Consultation is requested by:  Dr. Allison  Specific Consultation Request:  trauma  Primary care provider: Micheline Ref-Primary, Physician    General Information:   Onset Date: 20    History of Current Problem/Admission: Trauma [T14.90XA]  Crushing injury of multiple sites of right lower extremity, initial encounter [S87.81XA]    Prior Level of Function: Pt was completely independent with all ADLs and ambulation without assistive device.  Pt works at [employer] and was involved in a work place injury.  Pt drives.   Ambulation: 0 - Independent   Transferrin - Independent   Toiletin - Independent    Bathin - Independent   Dressin - Independent   Eatin - Independent   Communication: 0 - Understands/communicates without difficulty  Swallowin - swallows foods/liquids without difficulty  Cognition: 0 - no cognitive issues reported    Fall history within the last 6 months: No    Current Living Situation: Patient has 12 steps with bilateral rails to enter home.  Pt lives with his wife, his wife works as well.    Current Equipment Used at Home: none     Patient & Family Goals: pain control     Past Medical History:   History reviewed. No pertinent past medical history.    Past Surgical History:  History reviewed. No pertinent surgical history.    Medications:   Current Facility-Administered Medications   Medication     acetaminophen (TYLENOL) tablet 975 mg     enoxaparin ANTICOAGULANT (LOVENOX) injection 30 mg     fentaNYL (PF) (SUBLIMAZE) injection 100 mcg     HYDROcodone-acetaminophen (NORCO) 7.5-325 MG per tablet 1 tablet     ketorolac (TORADOL) injection 30 mg     lidocaine (LMX4) kit     lidocaine 1 % 1 mL     naloxone (NARCAN) injection 0.1-0.4 mg     ondansetron (ZOFRAN-ODT) ODT tab 4 mg    Or     ondansetron (ZOFRAN) injection 4  mg     prochlorperazine (COMPAZINE) injection 10 mg    Or     prochlorperazine (COMPAZINE) tablet 10 mg    Or     prochlorperazine (COMPAZINE) Suppository 25 mg     senna-docusate (SENOKOT-S/PERICOLACE) 8.6-50 MG per tablet 1-2 tablet     sodium chloride (PF) 0.9% PF flush 3 mL     sodium chloride (PF) 0.9% PF flush 3 mL       Weight Bearing Status: FWB bilateral LEs     Cognitive Status Examination:  Orientation: oriented to time, place and person  Level of Consciousness: alert  Follows Commands and Answers Questions: 100% of the time  Personal Safety and Judgement: Intact  Memory: Short-term memory intact and Long-term memory intact  Comments:     Pain:   Currently in pain? Yes  Pain Location? right knee  Pain Ratin at rest, 10 with any movement  Also has pain in right wrist 4/10 but states it's getting better    Physical Therapy Evaluation:   Integumentary/Edema: increased edema noted around right knee, has areas of edema that protrude with knee flexion ROM  ROM: very painful, full extension - flexion limited to approx 25 degrees due to severe pain  Strength: poor quad set at this time, fair HS strength, unable to differentiate if this is due to pain or muscular deficits.  L LE 5/5  Bed Mobility: sup>sit mod I  Transfers: sit<>stand CGA with immobilizer in place  Gait: Ambulated 10' with FWW CGA TTWB due to pain but reports pain as 4/10 with gait.    Balance: good with FWW  Sensory: reports full sensation now in R LE, was having numbness/tingling prior  Coordination: NT    Physical Therapy Interventions: Gait Training , Transfer Training, Risk Factor Education, Home Programming Guidelines and Progressive Activity/Exercise     Clinical Impressions:  Criteria for Skilled Therapeutic Intervention Met: Yes, treatment indicated  PT Diagnosis: gait disturbance s/p crush injury  Influenced by the following impairments: decreased strength, significant pain, decreased ROM, decreased activity tolerance  Functional  limitations due to impairment: impaired ability to complete transfers, ambulation, and stairs  Clinical presentation: Evolving/Changing  Clinical presentation rationale: clinical judgement  Clinical Decision making (complexity): Moderate Complexity  Frequency: 1 time/week  Predicted Duration of Therapy Intervention (days/wks): Eval only  Anticipated Discharge Disposition: Home with orthopedic surgeon follow up  Anticipated Equipment Needs at Discharge:   Risks and Benefits of therapy have been explained: Yes  Patient, Family & other staff in agreement with plan of care: Yes  Clinical Impression Comments: Pt s/p crush injury.  At time of functional mobility, MRI was pending but Dr. Allison okay to proceed with PT evaluation.  Pt was able to complete transfers with CGA.  Pt tolerated short distance ambulation fairly well with pain but pt reports it's tolerable.  Pt should follow up with orthopedics to further assess and guide future needs.      Total Eval Time: 21

## 2020-01-23 NOTE — PLAN OF CARE
Discharge Planner PT   Patient plan for discharge: home with follow up with orthopedics  Current status: sup>sit mod I, sit<>stand CGA, ambulated 70' with crutches CGA-SBA maintaining NWB through R LE primarily due to pain, pt managed 8 steps with single rail and single crutch SBA.  Educated to complete ankle pumps only but no additional exercises until orthopedics follow up.  Discussed trying to maintain NWB and be conservative until follow up with orthopedics.  Barriers to return to prior living situation: 12 steps to enter home  Recommendations for discharge: home with orthopedics follow up, no formal PT at this time  Rationale for recommendations: Pt s/p crush injury.  At time of functional mobility, MRI was pending but Dr. Allison okay to proceed with PT evaluation.  Pt was able to complete transfers with CGA.  Pt tolerated short distance ambulation fairly well with pain but pt reports it's tolerable.  Pt should follow up with orthopedics to further assess and guide future needs, no additional skilled PT needs at this time.  Did discuss use of immobilizer for all mobility OOB, can use at night if it is more comfortable.  Discussed importance of icing and elevating as well.       Entered by: Elisabeth Witt, PT 01/23/2020 1:07 PM

## 2020-01-23 NOTE — ED NOTES
Level 1 trauma called overhead prior to patient arrival in ED. Trauma team at bedside upon patient arrival in ED. Time out for bedside report from EMS. Patient transferred to ED bed with use of 6 staff. Dr. Dominguez performing trauma assessment and calling out findings. PAPA Rowe, recording. See trauma flowsheet for full trauma documentation.

## 2020-01-23 NOTE — PLAN OF CARE
Patient discharged at 1:02 PM via wheel chair accompanied by spouse and staff. Prescriptions sent to patients preferred pharmacy. All belongings sent with patient.     Discharge instructions reviewed with Patient and spouse. Listed belongings gathered and returned to patient. Yes    Patient discharged to Home.     Core Measures and Vaccines  Core Measures applicable during stay: No. If yes, state diagnosis:   Pneumonia and Influenza given prior to discharge, if indicated: No    Surgical Patient   Surgical Procedures during stay: No  Did patient receive discharge instruction on wound care and recognition of infection symptoms? N/A    MISC  Follow up appointment made:  Yes  Home and hospital aquired medications returned to patient: Yes  Patient reports pain was well managed at discharge: Yes

## 2020-01-23 NOTE — PLAN OF CARE
Dr Allison at bedside, Will apply ice to BL knees, elevate RLE as tolerated. Give NORCO as ordered. This writer revisited allergy to oxycodone, OK to give NORCO and call MD 1 hour after administration

## 2020-01-23 NOTE — ED NOTES
"While in CT patient was able to give more detailed account of what happened to cause right leg injury. 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 Patient states he was able to get out of the way at that time but then the roll slipped out from the second chain and fell down into him which caused him to \"sommersault across the room with the roll landing on my right leg and pinning be against a steel pole.\" States he was able to get his leg out on his own due to the steel pole he was wedged against. C/o right leg pain from ankle to right hip. C/o left medial knee pain, right wrist pain, right index and middle finger pain, and generalized aches all over now that he didn't feel right after injury. Patient also c/o chills and is shaking. Informed patient that could also be from his adrenaline wearing off after the injury. Informed patient we would get fresh warm blankets on him as soon as the CT is done. Remains alert and oriented and is even joking around with staff.   "

## 2020-01-23 NOTE — ED NOTES
Pt tramns[ported by EMS after incident at  [facility]. EMS report pt sustained a crush injury to his right lower extremity. The patient states that he tried to squeeze between two 1200 pound  rubber rolls, but didn't make it. One of the rolls fell onto his right knee and lower leg. His right hand was also pinched during the accident. He was able to self-extricate. He did not his his head or have a loss of consciousness. He did not sustain any other injury during the fall.  Pt arrives awake and alert. No c-collar in place. RLE air cast in place.   10/10 pain to RLE and RT hand with any movement. CMS intact.

## 2020-01-23 NOTE — PLAN OF CARE
Here for crushing injury of the right leg. Pt is A&O w/ VSS. Satting 97% on RA w/ no complaints of SOB or chest pain. Rates pain at a 4/10 to right lower leg characteristic of a constant, sharp feeling. Pain managed w/ IV toradol as scheduled. RLE is red w/ no N&T reported. Is able to wiggle toes and lift his leg about 2 inches off of pillow. Is only able to hold it there for about 2-3 seconds before experiencing extreme pain. No bruising noted to site. Extremity remains elevated on pillow w/ pt icing prn. Will remain on bedrest until evaluated by PT. Using urinal to void. Voided x1 w/ 400 mLs odorous/clear, subha-colored urine. Nausea reported at 0700 AM. Zofran amdinistered w/ adequate relief. Pleasant and cooperative w/ cares. Bed in lowest position. Call light in reach. ID band in place. Makes needs known. Will continue to monitor.    Face to face report given with opportunity to observe patient.    Report given to Cheryle Herrera   1/23/2020  7:30 AM

## 2020-01-23 NOTE — PROGRESS NOTES
Alber Jernigan 59 year old    Returned from MRI  Exam Performed : MRI RIGHT KNEE WO  Pushed to Reading Service?: No  Tolerated Procedure : well  May resume previous diet : Yes  Time Returned to Unit : 10:10  Report Given to : Cheryle    1/23/2020 10:13 AM   Brian Silva

## 2020-01-23 NOTE — PROGRESS NOTES
Name: Alber Jernigan    MRN#: [id number]    Reason for Hospitalization: Trauma [T14.90XA]  Crushing injury of multiple sites of right lower extremity, initial encounter [S87.81XA]    DIANA: low    Discharge Date: 1/23/2020    Patient / Family response to discharge plan: they understand and agree    Follow-Up Appt: No future appointments.    Other Providers (Care Coordinator, County Services, PCA services etc): No    Discharge Disposition: home via wife      Joanna Oden CM RN

## 2020-03-19 ENCOUNTER — HOSPITAL ENCOUNTER (OUTPATIENT)
Dept: MRI IMAGING | Facility: HOSPITAL | Age: 60
Discharge: HOME | End: 2020-03-19
Attending: ORTHOPAEDIC SURGERY | Admitting: ORTHOPAEDIC SURGERY
Payer: OTHER MISCELLANEOUS

## 2020-03-19 DIAGNOSIS — S83.412A: ICD-10-CM

## 2020-03-19 DIAGNOSIS — S83.249A MEDIAL MENISCUS TEAR: ICD-10-CM

## 2020-03-19 PROCEDURE — 73721 MRI JNT OF LWR EXTRE W/O DYE: CPT | Mod: TC,LT

## 2020-06-24 ENCOUNTER — MEDICAL CORRESPONDENCE (OUTPATIENT)
Dept: MRI IMAGING | Facility: HOSPITAL | Age: 60
End: 2020-06-24

## 2020-06-25 ENCOUNTER — HOSPITAL ENCOUNTER (OUTPATIENT)
Dept: MRI IMAGING | Facility: HOSPITAL | Age: 60
Discharge: HOME | End: 2020-06-25
Attending: ORTHOPAEDIC SURGERY | Admitting: ORTHOPAEDIC SURGERY
Payer: OTHER MISCELLANEOUS

## 2020-06-25 DIAGNOSIS — M25.561 RIGHT KNEE PAIN: ICD-10-CM

## 2020-06-25 PROCEDURE — 73721 MRI JNT OF LWR EXTRE W/O DYE: CPT | Mod: TC,RT

## 2020-09-08 ENCOUNTER — MEDICAL CORRESPONDENCE (OUTPATIENT)
Dept: MRI IMAGING | Facility: HOSPITAL | Age: 60
End: 2020-09-08

## 2020-09-11 ENCOUNTER — MEDICAL CORRESPONDENCE (OUTPATIENT)
Dept: MRI IMAGING | Facility: HOSPITAL | Age: 60
End: 2020-09-11

## 2020-09-16 ENCOUNTER — MEDICAL CORRESPONDENCE (OUTPATIENT)
Dept: MRI IMAGING | Facility: HOSPITAL | Age: 60
End: 2020-09-16

## 2020-09-17 ENCOUNTER — HOSPITAL ENCOUNTER (OUTPATIENT)
Dept: MRI IMAGING | Facility: HOSPITAL | Age: 60
Discharge: HOME | End: 2020-09-17
Attending: ORTHOPAEDIC SURGERY | Admitting: ORTHOPAEDIC SURGERY
Payer: OTHER MISCELLANEOUS

## 2020-09-17 DIAGNOSIS — S76.119A QUADRICEPS TENDON RUPTURE: ICD-10-CM

## 2020-09-17 PROCEDURE — 73721 MRI JNT OF LWR EXTRE W/O DYE: CPT | Mod: TC,RT

## 2020-09-23 ENCOUNTER — RESULTS ONLY (OUTPATIENT)
Dept: LAB | Age: 60
End: 2020-09-23

## 2020-09-23 LAB
APPEARANCE FLD: NORMAL
COLOR FLD: YELLOW
GRAM STN SPEC: NORMAL
MONOS+MACROS NFR FLD MANUAL: 15 %
NEUTS BAND NFR FLD MANUAL: 85 %
RBC # FLD: 9467 /UL
SPECIMEN SOURCE FLD: NORMAL
SPECIMEN SOURCE: NORMAL
WBC # FLD AUTO: 2897 /UL

## 2020-09-30 LAB
BACTERIA SPEC CULT: NORMAL
SPECIMEN SOURCE: NORMAL

## 2022-09-20 ENCOUNTER — HOSPITAL ENCOUNTER (EMERGENCY)
Facility: HOSPITAL | Age: 62
Discharge: HOME | End: 2022-09-20
Payer: OTHER MISCELLANEOUS

## 2022-09-20 VITALS
SYSTOLIC BLOOD PRESSURE: 157 MMHG | HEART RATE: 70 BPM | TEMPERATURE: 97.2 F | RESPIRATION RATE: 16 BRPM | DIASTOLIC BLOOD PRESSURE: 102 MMHG | OXYGEN SATURATION: 98 %

## 2022-09-20 DIAGNOSIS — L30.9 DERMATITIS: ICD-10-CM

## 2022-09-20 PROCEDURE — G0463 HOSPITAL OUTPT CLINIC VISIT: HCPCS

## 2022-09-20 PROCEDURE — 99213 OFFICE O/P EST LOW 20 MIN: CPT

## 2022-09-20 RX ORDER — HYDROXYZINE HYDROCHLORIDE 25 MG/1
50 TABLET, FILM COATED ORAL EVERY 6 HOURS PRN
Qty: 56 TABLET | Refills: 0 | Status: SHIPPED | OUTPATIENT
Start: 2022-09-20 | End: 2022-10-04

## 2022-09-20 RX ORDER — TRIAMCINOLONE ACETONIDE 5 MG/G
CREAM TOPICAL 2 TIMES DAILY
Qty: 15 G | Refills: 1 | Status: SHIPPED | OUTPATIENT
Start: 2022-09-20 | End: 2022-10-04

## 2022-09-20 RX ORDER — PREDNISONE 20 MG/1
40 TABLET ORAL DAILY
Qty: 14 TABLET | Refills: 0 | Status: SHIPPED | OUTPATIENT
Start: 2022-09-20 | End: 2022-09-27

## 2022-09-20 ASSESSMENT — ENCOUNTER SYMPTOMS
SHORTNESS OF BREATH: 0
CHEST TIGHTNESS: 0
FATIGUE: 0
CHILLS: 0
EYE DISCHARGE: 0
EYE PAIN: 0
SORE THROAT: 0
EYE ITCHING: 0
WHEEZING: 0
TROUBLE SWALLOWING: 0
FEVER: 0

## 2022-09-20 ASSESSMENT — ACTIVITIES OF DAILY LIVING (ADL): ADLS_ACUITY_SCORE: 35

## 2022-09-20 NOTE — ED PROVIDER NOTES
History     Chief Complaint   Patient presents with     Rash     HPI  Alber Jernigan is a 62 year old male who presents urgent care with a 1 week history of pruritic skin rash, started on his arms, has been spreading to his trunk, face and legs.  He works at [employer] and is exposed to numerous chemicals, he has had similar reactions in the past.  He has tried topical hydrocortisone cream and Benadryl without relief.  Denies shortness of breath, increased work of breathing, bleeding, drainage, discharge.    Allergies:  Allergies   Allergen Reactions     Oxycodone      Bradycardia Syncope       Problem List:    Patient Active Problem List    Diagnosis Date Noted     Crushing injury of right leg, initial encounter 01/22/2020     Priority: Medium        Past Medical History:    History reviewed. No pertinent past medical history.    Past Surgical History:    History reviewed. No pertinent surgical history.    Family History:    History reviewed. No pertinent family history.    Social History:  Marital Status:   [2]  Social History     Tobacco Use     Smoking status: Current Every Day Smoker     Packs/day: 1.00     Smokeless tobacco: Never Used   Substance Use Topics     Alcohol use: Yes     Comment: daily     Drug use: Never        Medications:    hydrOXYzine (ATARAX) 25 MG tablet  predniSONE (DELTASONE) 20 MG tablet  triamcinolone (ARISTOCORT HP) 0.5 % external cream  diphenhydrAMINE (BENADRYL) 25 MG tablet  HYDROcodone-acetaminophen (NORCO) 7.5-325 MG per tablet  senna-docusate (SENOKOT-S/PERICOLACE) 8.6-50 MG tablet          Review of Systems   Constitutional: Negative for chills, fatigue and fever.   HENT: Negative for sore throat and trouble swallowing.    Eyes: Negative for pain, discharge and itching.   Respiratory: Negative for chest tightness, shortness of breath and wheezing.    Skin: Positive for rash (And pruritus).   All other systems reviewed and are negative.      Physical Exam   BP: (!)  157/102  Pulse: 70  Temp: 97.2  F (36.2  C)  Resp: 16  SpO2: 98 %      Physical Exam  Constitutional:       General: He is not in acute distress.     Appearance: He is not ill-appearing.   Pulmonary:      Effort: Pulmonary effort is normal.   Skin:     General: Skin is warm and dry.      Findings: Rash (Scattered erythematous rash on upper extremities, anterior trunk, posterior trunk, face and lower extremities.  No bleeding, drainage, discharge.) present.   Neurological:      Mental Status: He is alert.         ED Course                 Procedures             Critical Care time:               No results found for this or any previous visit (from the past 24 hour(s)).    Medications - No data to display    Assessments & Plan (with Medical Decision Making)     History and physical exam is most consistent with allergic contact dermatitis.  Exam does not support anaphylactic reaction, toxic epidermal necrolysis.  Plan is to treat with prednisone 40 mg for 7 days, hydroxyzine 50 mg as needed and topical triamcinolone cream twice daily for up to 2 weeks.  Return if symptoms persist or worsen.    I have reviewed the nursing notes.    I have reviewed the findings, diagnosis, plan and need for follow up with the patient.      Discharge Medication List as of 9/20/2022 11:43 AM          Final diagnoses:   Dermatitis       9/20/2022   [facility]

## 2022-09-20 NOTE — ED TRIAGE NOTES
Patient has had this rash off and on for 3 years. 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 This time it has gotten worse, spreading and itching since Saturday

## 2022-09-20 NOTE — ED TRIAGE NOTES
Patient presents to urgent care with c/o a rash. States he gets it on and off from a chemical at work but this times its gotten worse. Mainly on his arms and back but now its spreading to his legs and face.   States he is having a very hard time sleeping and staying asleep from all the itching. Started last Thursday.

## 2022-09-20 NOTE — DISCHARGE INSTRUCTIONS
Prednisone 40 mg once daily for 7 days.  Hydroxyzine 50 mg 4 times daily as needed for itching.  Triamcinolone cream twice daily for up to 2 weeks.  Return if symptoms persist or worsen.

## 2022-10-11 ENCOUNTER — APPOINTMENT (OUTPATIENT)
Dept: OCCUPATIONAL MEDICINE | Facility: OTHER | Age: 62
End: 2022-10-11

## 2022-10-11 PROCEDURE — 92552 PURE TONE AUDIOMETRY AIR: CPT

## 2022-12-16 ENCOUNTER — HOSPITAL ENCOUNTER (EMERGENCY)
Facility: HOSPITAL | Age: 62
Discharge: HOME | End: 2022-12-16
Attending: NURSE PRACTITIONER | Admitting: NURSE PRACTITIONER
Payer: OTHER MISCELLANEOUS

## 2022-12-16 ENCOUNTER — APPOINTMENT (OUTPATIENT)
Dept: GENERAL RADIOLOGY | Facility: HOSPITAL | Age: 62
End: 2022-12-16
Attending: NURSE PRACTITIONER
Payer: OTHER MISCELLANEOUS

## 2022-12-16 VITALS
OXYGEN SATURATION: 96 % | TEMPERATURE: 97.4 F | SYSTOLIC BLOOD PRESSURE: 154 MMHG | DIASTOLIC BLOOD PRESSURE: 90 MMHG | HEART RATE: 70 BPM | RESPIRATION RATE: 16 BRPM

## 2022-12-16 DIAGNOSIS — S69.91XA WRIST INJURY, RIGHT, INITIAL ENCOUNTER: ICD-10-CM
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
## 2022-12-16 DIAGNOSIS — S63.501A WRIST SPRAIN, RIGHT, INITIAL ENCOUNTER: ICD-10-CM

## 2022-12-16 DIAGNOSIS — S49.91XA SHOULDER INJURY, RIGHT, INITIAL ENCOUNTER: ICD-10-CM

## 2022-12-16 PROCEDURE — 73110 X-RAY EXAM OF WRIST: CPT | Mod: RT

## 2022-12-16 PROCEDURE — 73030 X-RAY EXAM OF SHOULDER: CPT | Mod: RT

## 2022-12-16 PROCEDURE — 99213 OFFICE O/P EST LOW 20 MIN: CPT | Performed by: NURSE PRACTITIONER

## 2022-12-16 PROCEDURE — G0463 HOSPITAL OUTPT CLINIC VISIT: HCPCS

## 2022-12-16 ASSESSMENT — ENCOUNTER SYMPTOMS
ARTHRALGIAS: 1
MYALGIAS: 1
NECK PAIN: 0
JOINT SWELLING: 1

## 2022-12-16 ASSESSMENT — ACTIVITIES OF DAILY LIVING (ADL): ADLS_ACUITY_SCORE: 35

## 2022-12-16 NOTE — ED TRIAGE NOTES
Pt presents with c/o right shoulder pain and right wrist pain. Reports he fell in a parking lot at work due to ice. Denies hitting head, LOC, Neck pain, nausea, blurred/double vision. Denies use of blood thinners. Incident happened earlier this morning. CMS intact. ROM with pain. Pt has taken ibuprofen.

## 2022-12-16 NOTE — ED TRIAGE NOTES
Pt presents with c/o right shoulder and wrist pain from a fall this morning at work, denies hitting head.

## 2022-12-16 NOTE — DISCHARGE INSTRUCTIONS
Apply ice packs to your shoulder and your wrist 20 minutes on/off.  Use the wrist brace.    Take Tylenol or ibuprofen as needed for pain.    Schedule an appointment with your doctor for reevaluation early next week.    Return to emergency department for any concerning symptoms.

## 2022-12-16 NOTE — Clinical Note
Alber Jernigan was seen and treated in our emergency department on 12/16/2022.  He may return to work on 12/19/2022.       If you have any questions or concerns, please don't hesitate to call.      Mpofu, Prudence, CNP

## 2022-12-16 NOTE — ED PROVIDER NOTES
History     Chief Complaint   Patient presents with     Fall     HPI  Alber Jernigan is a 62 year old male who presents ambulatory to urgent care for evaluation.  Patient tells me that he was at work walking outside from one building to another when he slipped and fell on ice.  He tells me he landed on his back with his right hand and wrist behind his back.  He has pain into his right shoulder as well as his right wrist.  Denies hitting his head or loss of consciousness.  He has no history of surgeries to his wrist or hand.    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  This is a work-related injury.    Allergies:  Allergies   Allergen Reactions     Oxycodone      Bradycardia Syncope       Problem List:    Patient Active Problem List    Diagnosis Date Noted     Crushing injury of right leg, initial encounter 01/22/2020     Priority: Medium        Past Medical History:    History reviewed. No pertinent past medical history.    Past Surgical History:    History reviewed. No pertinent surgical history.    Family History:    History reviewed. No pertinent family history.    Social History:  Marital Status:   [2]  Social History     Tobacco Use     Smoking status: Every Day     Packs/day: 1.00     Types: Cigarettes     Smokeless tobacco: Never   Substance Use Topics     Alcohol use: Yes     Comment: daily     Drug use: Never        Medications:    diphenhydrAMINE (BENADRYL) 25 MG tablet  HYDROcodone-acetaminophen (NORCO) 7.5-325 MG per tablet  senna-docusate (SENOKOT-S/PERICOLACE) 8.6-50 MG tablet          Review of Systems   Musculoskeletal: Positive for arthralgias, joint swelling and myalgias. Negative for neck pain.   All other systems reviewed and are negative.      Physical Exam   BP: 154/90  Pulse: 70  Temp: 97.4  F (36.3  C)  Resp: 16  SpO2: 96 %      Physical Exam  Vitals and nursing note reviewed.   Constitutional:       Appearance: Normal appearance. He is not ill-appearing or  toxic-appearing.   HENT:      Head: Atraumatic.      Right Ear: Tympanic membrane and ear canal normal.      Left Ear: Tympanic membrane and ear canal normal.   Eyes:      Pupils: Pupils are equal, round, and reactive to light.   Cardiovascular:      Rate and Rhythm: Normal rate.   Musculoskeletal:      Right shoulder: Tenderness and bony tenderness present. No swelling, deformity, effusion, laceration or crepitus. Decreased range of motion. Normal strength. Normal pulse.      Right wrist: Tenderness, bony tenderness and snuff box tenderness present. No swelling, deformity or crepitus. Decreased range of motion. Normal pulse.      Cervical back: Neck supple.      Comments: Anterior right shoulder tenderness. Lateral abduction 90 degrees. Forward flexion 45 degrees.     Right wrist tenderness to palpation mostly localized to the radial aspect.  Also has snuffbox tenderness.   Skin:     General: Skin is warm and dry.      Capillary Refill: Capillary refill takes less than 2 seconds.   Neurological:      Mental Status: He is alert and oriented to person, place, and time.         ED Course                 Procedures             Results for orders placed or performed during the hospital encounter of 12/16/22 (from the past 24 hour(s))   XR Wrist Right G/E 3 Views    Narrative    PROCEDURE: XR WRIST RIGHT G/E 3 VIEWS 12/16/2022 10:57 AM    HISTORY: slipped on ice in a parking lot at work. CMS intact. ROM  present with pain. Pt able to ambulate. Denies hx of injury    COMPARISONS: 1/22/2020.    TECHNIQUE: 4 views.    FINDINGS: No acute fracture or dislocation is seen.    There is fairly severe degenerative change in the STT joint. This has  progressed when compared to the prior exam. Prominent subchondral cyst  is seen in the trapezium and in the distal scaphoid. There is also  cystic change in the capitate. There is mild to moderate degenerative  change in the first carpal metacarpal joint.         Impression     IMPRESSION: Degenerative change without acute fracture.    PAYAL NJ MD         SYSTEM ID:  [id number]   XR Shoulder Right G/E 3 Views    Narrative    PROCEDURE: XR SHOULDER RIGHT G/E 3 VIEWS 12/16/2022 10:58 AM    HISTORY: slipped on ice in a parking lot at work. CMS intact. ROM  present with pain. Pt able to ambulate. Denies hx of injury.    COMPARISONS: None.    TECHNIQUE: 4 views.    FINDINGS: There is mild degenerative change in the AC joint.  Acromiohumeral distance is fairly normal. No fracture or dislocation  is seen.         Impression    IMPRESSION: No acute fracture.    PAYAL NJ MD         SYSTEM ID:  [id number]       Medications - No data to display    Assessments & Plan (with Medical Decision Making)     I have reviewed the nursing notes.    62-year-old male that presented for evaluation of an injury after he fell and slipped on ice whilst walking outside from 1 building to another at work.  Patient has decreased range of motion noted to his right shoulder along with anterior right shoulder tenderness.  Right wrist tenderness localized to the radial aspect.  Mildly decreased range of motion to the right wrist.  He denies hitting his head or loss of consciousness.    X-rays for both right shoulder and wrist are negative for acute fractures or dislocation.  He does have degenerative changes due to his right wrist which were discussed with patient.  Patient declined anything for pain during this visit.    Right wrist brace was given to him today.  Recommended icing shoulder and wrist.  Take Tylenol or ibuprofen as needed for pain.  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  Work excuse note relaying  work restrictions given today.  Patient was strongly advised to follow-up with his primary medical provider for reevaluation as well as to get cleared from work restrictions.  Patient plans on scheduling an appointment with  his doctor shortly after this visit.  Return to urgent care Emergency Department for any concerning symptoms.    I have reviewed the findings, diagnosis, plan and need for follow up with the patient.  This document was prepared using a combination of typing and voice generated software.  While every attempt was made for accuracy, spelling and grammatical errors may exist.    New Prescriptions    No medications on file       Final diagnoses:   Fall due to slipping on ice or snow, initial encounter   Shoulder injury, right, initial encounter   Wrist injury, right, initial encounter   Work related injury   Wrist sprain, right, initial encounter       12/16/2022   [facility]     Mpofu, Prudence, CNP  12/16/22 1129

## 2023-06-18 ENCOUNTER — TRANSFERRED RECORDS (OUTPATIENT)
Dept: HEALTH INFORMATION MANAGEMENT | Facility: CLINIC | Age: 63
End: 2023-06-18

## 2023-06-25 ENCOUNTER — APPOINTMENT (OUTPATIENT)
Dept: CT IMAGING | Facility: HOSPITAL | Age: 63
End: 2023-06-25
Attending: EMERGENCY MEDICINE
Payer: OTHER MISCELLANEOUS

## 2023-06-25 ENCOUNTER — HOSPITAL ENCOUNTER (EMERGENCY)
Facility: HOSPITAL | Age: 63
Discharge: ANOTHER HOSPITAL | End: 2023-06-25
Attending: EMERGENCY MEDICINE | Admitting: EMERGENCY MEDICINE
Payer: OTHER MISCELLANEOUS

## 2023-06-25 ENCOUNTER — TRANSFERRED RECORDS (OUTPATIENT)
Dept: HEALTH INFORMATION MANAGEMENT | Facility: CLINIC | Age: 63
End: 2023-06-25

## 2023-06-25 ENCOUNTER — APPOINTMENT (OUTPATIENT)
Dept: GENERAL RADIOLOGY | Facility: HOSPITAL | Age: 63
End: 2023-06-25
Attending: EMERGENCY MEDICINE
Payer: OTHER MISCELLANEOUS

## 2023-06-25 VITALS
HEART RATE: 69 BPM | DIASTOLIC BLOOD PRESSURE: 108 MMHG | WEIGHT: 225 LBS | RESPIRATION RATE: 16 BRPM | BODY MASS INDEX: 29.69 KG/M2 | OXYGEN SATURATION: 96 % | TEMPERATURE: 98.6 F | SYSTOLIC BLOOD PRESSURE: 181 MMHG

## 2023-06-25 DIAGNOSIS — M65.949 TENOSYNOVITIS OF HAND: ICD-10-CM

## 2023-06-25 DIAGNOSIS — L03.113 CELLULITIS OF RIGHT UPPER EXTREMITY: ICD-10-CM

## 2023-06-25 LAB
ABO/RH(D): NORMAL
ALBUMIN SERPL BCG-MCNC: 4.2 G/DL (ref 3.5–5.2)
ALBUMIN UR-MCNC: NEGATIVE MG/DL
ALP SERPL-CCNC: 80 U/L (ref 40–129)
ALT SERPL W P-5'-P-CCNC: 63 U/L (ref 0–70)
ANION GAP SERPL CALCULATED.3IONS-SCNC: 14 MMOL/L (ref 7–15)
ANTIBODY SCREEN: NEGATIVE
APPEARANCE UR: CLEAR
AST SERPL W P-5'-P-CCNC: 49 U/L (ref 0–45)
BASE EXCESS BLDV CALC-SCNC: 0.7 MMOL/L (ref -7.7–1.9)
BASOPHILS # BLD AUTO: 0.1 10E3/UL (ref 0–0.2)
BASOPHILS NFR BLD AUTO: 1 %
BILIRUB SERPL-MCNC: 0.3 MG/DL
BILIRUB UR QL STRIP: NEGATIVE
BUN SERPL-MCNC: 15.7 MG/DL (ref 8–23)
CALCIUM SERPL-MCNC: 9.4 MG/DL (ref 8.8–10.2)
CHLORIDE SERPL-SCNC: 100 MMOL/L (ref 98–107)
COLOR UR AUTO: NORMAL
CREAT SERPL-MCNC: 0.82 MG/DL (ref 0.67–1.17)
CRP SERPL-MCNC: <3 MG/L
DEPRECATED HCO3 PLAS-SCNC: 21 MMOL/L (ref 22–29)
EOSINOPHIL # BLD AUTO: 0.4 10E3/UL (ref 0–0.7)
EOSINOPHIL NFR BLD AUTO: 3 %
ERYTHROCYTE [DISTWIDTH] IN BLOOD BY AUTOMATED COUNT: 13.1 % (ref 10–15)
ERYTHROCYTE [SEDIMENTATION RATE] IN BLOOD BY WESTERGREN METHOD: 7 MM/HR (ref 0–20)
FIBRINOGEN PPP-MCNC: 276 MG/DL (ref 170–490)
GFR SERPL CREATININE-BSD FRML MDRD: >90 ML/MIN/1.73M2
GLUCOSE SERPL-MCNC: 114 MG/DL (ref 70–99)
GLUCOSE UR STRIP-MCNC: NEGATIVE MG/DL
HCO3 BLDV-SCNC: 26 MMOL/L (ref 21–28)
HCT VFR BLD AUTO: 46.1 % (ref 40–53)
HGB BLD-MCNC: 15.8 G/DL (ref 13.3–17.7)
HGB UR QL STRIP: NEGATIVE
HOLD SPECIMEN: NORMAL
IMM GRANULOCYTES # BLD: 0 10E3/UL
IMM GRANULOCYTES NFR BLD: 0 %
KETONES UR STRIP-MCNC: NEGATIVE MG/DL
LACTATE SERPL-SCNC: 1.3 MMOL/L (ref 0.7–2)
LEUKOCYTE ESTERASE UR QL STRIP: NEGATIVE
LYMPHOCYTES # BLD AUTO: 2.3 10E3/UL (ref 0.8–5.3)
LYMPHOCYTES NFR BLD AUTO: 22 %
MCH RBC QN AUTO: 32.1 PG (ref 26.5–33)
MCHC RBC AUTO-ENTMCNC: 34.3 G/DL (ref 31.5–36.5)
MCV RBC AUTO: 94 FL (ref 78–100)
MONOCYTES # BLD AUTO: 0.7 10E3/UL (ref 0–1.3)
MONOCYTES NFR BLD AUTO: 7 %
NEUTROPHILS # BLD AUTO: 7 10E3/UL (ref 1.6–8.3)
NEUTROPHILS NFR BLD AUTO: 67 %
NITRATE UR QL: NEGATIVE
NRBC # BLD AUTO: 0 10E3/UL
NRBC BLD AUTO-RTO: 0 /100
O2/TOTAL GAS SETTING VFR VENT: 21 %
OXYHGB MFR BLDV: 86 % (ref 70–75)
PCO2 BLDV: 41 MM HG (ref 40–50)
PH BLDV: 7.4 [PH] (ref 7.32–7.43)
PH UR STRIP: 5.5 [PH] (ref 4.7–8)
PLATELET # BLD AUTO: 247 10E3/UL (ref 150–450)
PO2 BLDV: 58 MM HG (ref 25–47)
POTASSIUM SERPL-SCNC: 4.1 MMOL/L (ref 3.4–5.3)
PROCALCITONIN SERPL IA-MCNC: 0.06 NG/ML
PROT SERPL-MCNC: 7.1 G/DL (ref 6.4–8.3)
RBC # BLD AUTO: 4.92 10E6/UL (ref 4.4–5.9)
RBC URINE: <1 /HPF
SODIUM SERPL-SCNC: 135 MMOL/L (ref 136–145)
SP GR UR STRIP: 1.01 (ref 1–1.03)
SPECIMEN EXPIRATION DATE: NORMAL
SQUAMOUS EPITHELIAL: 0 /HPF
UROBILINOGEN UR STRIP-MCNC: NORMAL MG/DL
WBC # BLD AUTO: 10.6 10E3/UL (ref 4–11)
WBC URINE: <1 /HPF

## 2023-06-25 PROCEDURE — 96361 HYDRATE IV INFUSION ADD-ON: CPT

## 2023-06-25 PROCEDURE — 87086 URINE CULTURE/COLONY COUNT: CPT | Performed by: EMERGENCY MEDICINE

## 2023-06-25 PROCEDURE — 96365 THER/PROPH/DIAG IV INF INIT: CPT

## 2023-06-25 PROCEDURE — 80053 COMPREHEN METABOLIC PANEL: CPT | Performed by: EMERGENCY MEDICINE

## 2023-06-25 PROCEDURE — 250N000011 HC RX IP 250 OP 636: Performed by: EMERGENCY MEDICINE

## 2023-06-25 PROCEDURE — 86900 BLOOD TYPING SEROLOGIC ABO: CPT | Performed by: EMERGENCY MEDICINE

## 2023-06-25 PROCEDURE — 85384 FIBRINOGEN ACTIVITY: CPT | Performed by: EMERGENCY MEDICINE

## 2023-06-25 PROCEDURE — 96367 TX/PROPH/DG ADDL SEQ IV INF: CPT

## 2023-06-25 PROCEDURE — 36415 COLL VENOUS BLD VENIPUNCTURE: CPT | Performed by: EMERGENCY MEDICINE

## 2023-06-25 PROCEDURE — 99285 EMERGENCY DEPT VISIT HI MDM: CPT | Mod: 25

## 2023-06-25 PROCEDURE — 96375 TX/PRO/DX INJ NEW DRUG ADDON: CPT

## 2023-06-25 PROCEDURE — 86140 C-REACTIVE PROTEIN: CPT | Performed by: EMERGENCY MEDICINE

## 2023-06-25 PROCEDURE — 73110 X-RAY EXAM OF WRIST: CPT | Mod: RT

## 2023-06-25 PROCEDURE — 82805 BLOOD GASES W/O2 SATURATION: CPT | Performed by: EMERGENCY MEDICINE

## 2023-06-25 PROCEDURE — 96366 THER/PROPH/DIAG IV INF ADDON: CPT

## 2023-06-25 PROCEDURE — 258N000003 HC RX IP 258 OP 636: Performed by: EMERGENCY MEDICINE

## 2023-06-25 PROCEDURE — 250N000011 HC RX IP 250 OP 636: Mod: JZ | Performed by: EMERGENCY MEDICINE

## 2023-06-25 PROCEDURE — 85025 COMPLETE CBC W/AUTO DIFF WBC: CPT | Performed by: EMERGENCY MEDICINE

## 2023-06-25 PROCEDURE — 96376 TX/PRO/DX INJ SAME DRUG ADON: CPT

## 2023-06-25 PROCEDURE — 84145 PROCALCITONIN (PCT): CPT | Performed by: EMERGENCY MEDICINE

## 2023-06-25 PROCEDURE — 73201 CT UPPER EXTREMITY W/DYE: CPT | Mod: RT

## 2023-06-25 PROCEDURE — 99291 CRITICAL CARE FIRST HOUR: CPT | Performed by: EMERGENCY MEDICINE

## 2023-06-25 PROCEDURE — 87040 BLOOD CULTURE FOR BACTERIA: CPT | Performed by: EMERGENCY MEDICINE

## 2023-06-25 PROCEDURE — 83605 ASSAY OF LACTIC ACID: CPT | Performed by: EMERGENCY MEDICINE

## 2023-06-25 PROCEDURE — 85652 RBC SED RATE AUTOMATED: CPT | Performed by: EMERGENCY MEDICINE

## 2023-06-25 PROCEDURE — 81001 URINALYSIS AUTO W/SCOPE: CPT | Performed by: EMERGENCY MEDICINE

## 2023-06-25 PROCEDURE — 250N000011 HC RX IP 250 OP 636: Mod: JZ

## 2023-06-25 RX ORDER — IOPAMIDOL 755 MG/ML
100 INJECTION, SOLUTION INTRAVASCULAR ONCE
Status: COMPLETED | OUTPATIENT
Start: 2023-06-25 | End: 2023-06-25

## 2023-06-25 RX ORDER — CLINDAMYCIN PHOSPHATE 900 MG/50ML
900 INJECTION, SOLUTION INTRAVENOUS ONCE
Status: COMPLETED | OUTPATIENT
Start: 2023-06-25 | End: 2023-06-25

## 2023-06-25 RX ORDER — KETOROLAC TROMETHAMINE 15 MG/ML
15 INJECTION, SOLUTION INTRAMUSCULAR; INTRAVENOUS ONCE
Status: COMPLETED | OUTPATIENT
Start: 2023-06-25 | End: 2023-06-25

## 2023-06-25 RX ORDER — IBUPROFEN 200 MG
200 TABLET ORAL EVERY 4 HOURS PRN
COMMUNITY

## 2023-06-25 RX ORDER — VANCOMYCIN HYDROCHLORIDE 1 G/200ML
INJECTION, SOLUTION INTRAVENOUS
Status: COMPLETED
Start: 2023-06-25 | End: 2023-06-25

## 2023-06-25 RX ADMIN — HYDROMORPHONE HYDROCHLORIDE 1 MG: 1 INJECTION, SOLUTION INTRAMUSCULAR; INTRAVENOUS; SUBCUTANEOUS at 01:04

## 2023-06-25 RX ADMIN — CLINDAMYCIN IN 5 PERCENT DEXTROSE 900 MG: 18 INJECTION, SOLUTION INTRAVENOUS at 04:19

## 2023-06-25 RX ADMIN — PIPERACILLIN SODIUM AND TAZOBACTAM SODIUM 4.5 G: 4; .5 INJECTION, SOLUTION INTRAVENOUS at 03:29

## 2023-06-25 RX ADMIN — HYDROMORPHONE HYDROCHLORIDE 1 MG: 1 INJECTION, SOLUTION INTRAMUSCULAR; INTRAVENOUS; SUBCUTANEOUS at 10:51

## 2023-06-25 RX ADMIN — IOPAMIDOL 100 ML: 755 INJECTION, SOLUTION INTRAVENOUS at 02:02

## 2023-06-25 RX ADMIN — HYDROMORPHONE HYDROCHLORIDE 1 MG: 1 INJECTION, SOLUTION INTRAMUSCULAR; INTRAVENOUS; SUBCUTANEOUS at 09:15

## 2023-06-25 RX ADMIN — SODIUM CHLORIDE 1000 ML: 9 INJECTION, SOLUTION INTRAVENOUS at 01:05

## 2023-06-25 RX ADMIN — HYDROMORPHONE HYDROCHLORIDE 1 MG: 1 INJECTION, SOLUTION INTRAMUSCULAR; INTRAVENOUS; SUBCUTANEOUS at 04:48

## 2023-06-25 RX ADMIN — HYDROMORPHONE HYDROCHLORIDE 1 MG: 1 INJECTION, SOLUTION INTRAMUSCULAR; INTRAVENOUS; SUBCUTANEOUS at 07:07

## 2023-06-25 RX ADMIN — HYDROMORPHONE HYDROCHLORIDE 1 MG: 1 INJECTION, SOLUTION INTRAMUSCULAR; INTRAVENOUS; SUBCUTANEOUS at 11:50

## 2023-06-25 RX ADMIN — HYDROMORPHONE HYDROCHLORIDE 1 MG: 1 INJECTION, SOLUTION INTRAMUSCULAR; INTRAVENOUS; SUBCUTANEOUS at 03:17

## 2023-06-25 RX ADMIN — VANCOMYCIN HYDROCHLORIDE 2000 MG: 1 INJECTION, SOLUTION INTRAVENOUS at 04:56

## 2023-06-25 ASSESSMENT — ACTIVITIES OF DAILY LIVING (ADL)
ADLS_ACUITY_SCORE: 35

## 2023-06-25 ASSESSMENT — ENCOUNTER SYMPTOMS
SHORTNESS OF BREATH: 0
CHILLS: 0
FEVER: 0

## 2023-06-25 NOTE — ED NOTES
Face to face report given with opportunity to observe patient.    Report given to PAPA Petersen RN   6/25/2023  7:02 AM

## 2023-06-25 NOTE — ED PROVIDER NOTES
"  History     Chief Complaint   Patient presents with     Wrist Pain     Right wrist, started Friday and has worsened since, swelling noted     HPI  Alber Jernigan is a 63 year old male who is here with right wrist pain.  Denies injury.  States he has a old cyst on his hand, pain started there and then hours later had pain to the entire right dorsum of his hand and wrist.  Denies any acute injuries.  No fever no chills.  Pain 10 out of 10.  Also notes swelling to that hand.  Does report history of \"rotting nails \".    Allergies:  Allergies   Allergen Reactions     Oxycodone      Bradycardia Syncope       Problem List:    Patient Active Problem List    Diagnosis Date Noted     Crushing injury of right leg, initial encounter 01/22/2020     Priority: Medium        Past Medical History:    No past medical history on file.    Past Surgical History:    No past surgical history on file.    Family History:    No family history on file.    Social History:  Marital Status:   [2]  Social History     Tobacco Use     Smoking status: Every Day     Packs/day: 1.00     Types: Cigarettes     Smokeless tobacco: Never   Substance Use Topics     Alcohol use: Yes     Comment: daily     Drug use: Never        Medications:    ibuprofen (ADVIL/MOTRIN) 200 MG tablet  senna-docusate (SENOKOT-S/PERICOLACE) 8.6-50 MG tablet          Review of Systems   Constitutional: Negative for chills and fever.   Respiratory: Negative for shortness of breath.    Cardiovascular: Negative for chest pain.   All other systems reviewed and are negative.      Physical Exam   BP: (!) 183/111  Pulse: 82  Temp: 97.9  F (36.6  C)  Resp: 18  Weight: 102.1 kg (225 lb) (patient stated)  SpO2: 97 %      Physical Exam  Constitutional:       General: He is not in acute distress.     Appearance: Normal appearance.   HENT:      Head: Normocephalic and atraumatic.      Right Ear: External ear normal.      Left Ear: External ear normal.      Nose: Nose normal. No " rhinorrhea.   Eyes:      Conjunctiva/sclera: Conjunctivae normal.   Cardiovascular:      Rate and Rhythm: Normal rate and regular rhythm.      Pulses: Normal pulses.   Pulmonary:      Effort: Pulmonary effort is normal. No respiratory distress.      Breath sounds: Normal breath sounds.   Abdominal:      General: There is no distension.      Palpations: Abdomen is soft.      Tenderness: There is no abdominal tenderness.   Musculoskeletal:         General: Swelling and tenderness present. No deformity or signs of injury.      Comments: Swelling to entire right hand primarily on the dorsal surface, pain out of proportion, unable to move hand due to severe pain, no obvious crepitus, 2+ radial pulse, no pain on palmar surface of hand, able to make fist and extend fingers, patient is very very tanned so hard to appreciate any erythema, increase in warmth is noted   Skin:     General: Skin is warm and dry.      Capillary Refill: Capillary refill takes less than 2 seconds.   Neurological:      General: No focal deficit present.      Mental Status: He is alert. Mental status is at baseline.   Psychiatric:         Mood and Affect: Mood normal.         Behavior: Behavior normal.         ED Course                 Procedures             Critical Care time:  was 69 minutes for this patient excluding procedures.             Results for orders placed or performed during the hospital encounter of 06/25/23 (from the past 24 hour(s))   CBC with platelets differential    Narrative    The following orders were created for panel order CBC with platelets differential.  Procedure                               Abnormality         Status                     ---------                               -----------         ------                     CBC with platelets and d...[[id number]]                      Final result                 Please view results for these tests on the individual orders.   Comprehensive metabolic panel   Result Value Ref  Range    Sodium 135 (L) 136 - 145 mmol/L    Potassium 4.1 3.4 - 5.3 mmol/L    Chloride 100 98 - 107 mmol/L    Carbon Dioxide (CO2) 21 (L) 22 - 29 mmol/L    Anion Gap 14 7 - 15 mmol/L    Urea Nitrogen 15.7 8.0 - 23.0 mg/dL    Creatinine 0.82 0.67 - 1.17 mg/dL    Calcium 9.4 8.8 - 10.2 mg/dL    Glucose 114 (H) 70 - 99 mg/dL    Alkaline Phosphatase 80 40 - 129 U/L    AST 49 (H) 0 - 45 U/L    ALT 63 0 - 70 U/L    Protein Total 7.1 6.4 - 8.3 g/dL    Albumin 4.2 3.5 - 5.2 g/dL    Bilirubin Total 0.3 <=1.2 mg/dL    GFR Estimate >90 >60 mL/min/1.73m2   Lactic acid whole blood   Result Value Ref Range    Lactic Acid 1.3 0.7 - 2.0 mmol/L   Blood gas venous and oxyhgb   Result Value Ref Range    pH Venous 7.40 7.32 - 7.43    pCO2 Venous 41 40 - 50 mm Hg    pO2 Venous 58 (H) 25 - 47 mm Hg    Bicarbonate Venous 26 21 - 28 mmol/L    FIO2 21     Oxyhemoglobin Venous 86 (H) 70 - 75 %    Base Excess/Deficit (+/-) 0.7 -7.7 - 1.9 mmol/L   Fibrinogen activity   Result Value Ref Range    Fibrinogen Activity 276 170 - 490 mg/dL   Procalcitonin   Result Value Ref Range    Procalcitonin 0.06 (H) <0.05 ng/mL   ABO/Rh type and screen    Narrative    The following orders were created for panel order ABO/Rh type and screen.  Procedure                               Abnormality         Status                     ---------                               -----------         ------                     Adult Type and Screen[[id number]]                            Edited Result - FINAL        Please view results for these tests on the individual orders.   Erythrocyte sedimentation rate auto   Result Value Ref Range    Erythrocyte Sedimentation Rate 7 0 - 20 mm/hr   CRP inflammation   Result Value Ref Range    CRP Inflammation <3.00 <5.00 mg/L   [place] Draw    Narrative    The following orders were created for panel order [place] Draw.  Procedure                               Abnormality         Status                     ---------                                -----------         ------                     Extra Blue Top Tube[[id number]]                              Final result               Extra Red Top Tube[[id number]]                               Final result               Extra Blood Bank Purple ...[[id number]]                      Final result                 Please view results for these tests on the individual orders.   CBC with platelets and differential   Result Value Ref Range    WBC Count 10.6 4.0 - 11.0 10e3/uL    RBC Count 4.92 4.40 - 5.90 10e6/uL    Hemoglobin 15.8 13.3 - 17.7 g/dL    Hematocrit 46.1 40.0 - 53.0 %    MCV 94 78 - 100 fL    MCH 32.1 26.5 - 33.0 pg    MCHC 34.3 31.5 - 36.5 g/dL    RDW 13.1 10.0 - 15.0 %    Platelet Count 247 150 - 450 10e3/uL    % Neutrophils 67 %    % Lymphocytes 22 %    % Monocytes 7 %    % Eosinophils 3 %    % Basophils 1 %    % Immature Granulocytes 0 %    NRBCs per 100 WBC 0 <1 /100    Absolute Neutrophils 7.0 1.6 - 8.3 10e3/uL    Absolute Lymphocytes 2.3 0.8 - 5.3 10e3/uL    Absolute Monocytes 0.7 0.0 - 1.3 10e3/uL    Absolute Eosinophils 0.4 0.0 - 0.7 10e3/uL    Absolute Basophils 0.1 0.0 - 0.2 10e3/uL    Absolute Immature Granulocytes 0.0 <=0.4 10e3/uL    Absolute NRBCs 0.0 10e3/uL   Adult Type and Screen   Result Value Ref Range    ABO/RH(D) A POS     Antibody Screen Negative Negative    SPECIMEN EXPIRATION DATE [id number]    Extra Blue Top Tube   Result Value Ref Range    Hold Specimen JI    Extra Red Top Tube   Result Value Ref Range    Hold Specimen JI    Extra Blood Bank Purple Top Tube   Result Value Ref Range    Hold Specimen JI        Medications   sodium chloride (PF) 0.9% PF flush 3 mL (has no administration in time range)   sodium chloride (PF) 0.9% PF flush 3 mL (3 mLs Intracatheter $Given 6/25/23 0105)   vancomycin (VANCOCIN) 2,000 mg in 0.9% NaCl 500 mL intermittent infusion (2,000 mg Intravenous Not Given 6/25/23 1205)   HYDROmorphone (DILAUDID) injection 1 mg (1 mg Intravenous $Given  6/25/23 0104)   0.9% sodium chloride BOLUS (0 mLs Intravenous Stopped 6/25/23 0205)   ketorolac (TORADOL) injection 15 mg (15 mg Intravenous Not Given 6/25/23 0103)   iopamidol (ISOVUE-370) solution 100 mL (100 mLs Intravenous $Given 6/25/23 0202)   sodium chloride (PF) 0.9% PF flush 60 mL (50 mLs Intravenous $Given 6/25/23 0202)   piperacillin-tazobactam (ZOSYN) intermittent infusion 4.5 g (0 g Intravenous Stopped 6/25/23 0401)   clindamycin (CLEOCIN) 900 mg in 50 mL D5W intermittent infusion (0 mg Intravenous Stopped 6/25/23 0456)   HYDROmorphone (DILAUDID) injection 1 mg (1 mg Intravenous $Given 6/25/23 0317)   HYDROmorphone (DILAUDID) injection 1 mg (1 mg Intravenous $Given 6/25/23 0448)   vancomycin in dextrose (VANCOCIN) 1-5 GM/200ML-% infusion (2,000 mg  $New Bag 6/25/23 0456)       Assessments & Plan (with Medical Decision Making)     I have reviewed the nursing notes.    I have reviewed the findings, diagnosis, plan and need for follow up with the patient.    Critical Care Addendum  My initial assessment, based on my review of nursing observations, review of vital signs, focused history, physical exam and review of cardiac rhythm monitor, established a high suspicion that Alber Jernigan has sepsis with indication for early goal-directed therapy, which requires immediate intervention, and therefore he is critically ill.     After the initial assessment, the care team initiated multiple lab tests, initiated IV fluid administration and initiated medication therapy with vanc, zosyn, clinda to provide stabilization care. Due to the critical nature of this patient, I reassessed nursing observations, vital signs, physical exam, review of cardiac rhythm monitor, mental status, neurologic status and respiratory status multiple times prior to his disposition.     Time also spent performing documentation, reviewing test results and coordination of care.     Critical care time (excluding teaching time and  procedures): 69 minutes.         Medical Decision Making  The patient's presentation was of high complexity (an acute health issue posing potential threat to life or bodily function).    The patient's evaluation involved:  ordering and/or review of 3+ test(s) in this encounter (see separate area of note for details)    The patient's management necessitated high risk (a parenteral controlled substance) and high risk (a decision regarding hospitalization).    63-year-old male here with right hand pain, primarily on dorsal surface.  Has progressed rapidly over 24 hours per patient.  Vital signs reassuring.  Given rapid progression, there is some concern for necrotizing fasciitis however slightly less concerned given absence of fever.  Plain film without gas.  Labs all normal.  Still, neck testing fasciitis can appear in a patient with normal labs so elected to get CT with and without contrast of right wrist to include hand and distal forearm.  Cellulitis is noted along with a fluid collection and inflammation of extensor tendons, concern for cellulitis and tenosynovitis.  Likely has abscess.  Giving antibiotics and will transfer to facility that has hand surgery coverage.    New Prescriptions    No medications on file       Final diagnoses:   Tenosynovitis of hand   Cellulitis of right upper extremity       6/25/2023   [facility]     Wild Dennis MD  06/25/23 9621

## 2023-06-25 NOTE — DISCHARGE INSTRUCTIONS
What to expect when you have contrast    During your exam, we will inject  contrast  into your vein or artery. (Contrast is a clear liquid with iodine in it. It shows up on X-rays.)    You may feel warm or hot. You may have a metal taste in your mouth and a slight upset stomach. You may also feel pressure near the kidneys and bladder. These effects will last about 1 to 3 minutes.    Please tell us if you have:   Sneezing    Itching   Hives    Swelling in the face   A hoarse voice   Breathing problems   Other new symptoms    Serious problems are rare.  They may include:   Irregular heartbeat    Seizures   Kidney failure             Tissue damage   Shock     Death    If you have any problems during the exam, we  will treat them right away.    When you get home    Call your hospital if you have any new symptoms in the next 2 days, like hives or swelling. (Phone numbers are at the bottom of this page.) Or call your family doctor.     If you have wheezing or trouble breathing, call 911.    Self-care  -Drink at least 4 extra glasses of water today.   This reduces the stress on your kidneys.  -Keep taking your regular medicines.    The contrast will pass out of your body in your  Urine(pee). This will happen in the next 24 hours. You  will not feel this. Your urine will not  change color.    If you have kidney problems or take metformin    Drink 4 to 8 large glasses of water for the next  2 days, if you are not on a fluid restriction.    ?If you take metformin (Glucophage or Glucovance) for diabetes, keep taking it.      ?Your kidney function tests are abnormal.  If you take Metformin, do not take it for 48 hours. Please go to your clinic for a blood test within 3 days after your exam before the restarting this medicine.     (Note to provider:please give patient prescription for lab tests.)    ?Special instructions: -    I have read and understand the above information.    Patient Sign  Here:______________________________________Date:________Time:______    Staff Sign Here:________________________________________Date:_______Time:______      Radiology Departments:     ?[facility]: [phone] ?[place]: [phone]     ?[place]: [phone] ?[place]:[phone]      ?[place]: [phone]  ?[place]: [phone]  ?[place]:[phone]    ?[facility]:[phone]  ?[facility]:[phone]

## 2023-06-25 NOTE — PHARMACY-VANCOMYCIN DOSING SERVICE
Pharmacy received consult to dose vancomycin for ED for one time dosing. Indication(s): Sepsis, SSTI. Dosed at 20 mg/kg using weight of 102.1 kg which gave a calculated dose of 2000 mg. I didn't dose or verify this order - just putting this note in retrospectively for completeness. Please order another pharmacy to dose vancomycin consult if admitted and vancomycin is needed. Thank you.

## 2023-06-25 NOTE — ED NOTES
Patient presents via triage with c/o right wrist pain. Patient rates pain 10/10. Patient states that he first noted some pain/swelling to right wrist on Friday and both have worsened since. Patient states that he has been unable to sleep due to pain. Patient reports no known injury. Patient is able to move right elbow and shoulder joints, but painful movement of right wrist and fingers.

## 2023-06-26 ENCOUNTER — TRANSFERRED RECORDS (OUTPATIENT)
Dept: HEALTH INFORMATION MANAGEMENT | Facility: CLINIC | Age: 63
End: 2023-06-26

## 2023-06-27 ENCOUNTER — TRANSFERRED RECORDS (OUTPATIENT)
Dept: HEALTH INFORMATION MANAGEMENT | Facility: CLINIC | Age: 63
End: 2023-06-27

## 2023-06-27 LAB — BACTERIA UR CULT: NO GROWTH

## 2023-07-01 LAB
BACTERIA BLD CULT: NO GROWTH
BACTERIA BLD CULT: NO GROWTH

## 2023-07-18 ENCOUNTER — APPOINTMENT (OUTPATIENT)
Dept: ULTRASOUND IMAGING | Facility: HOSPITAL | Age: 63
End: 2023-07-18
Attending: NURSE PRACTITIONER
Payer: OTHER MISCELLANEOUS

## 2023-07-18 ENCOUNTER — HOSPITAL ENCOUNTER (EMERGENCY)
Facility: HOSPITAL | Age: 63
Discharge: HOME | End: 2023-07-18
Attending: NURSE PRACTITIONER | Admitting: NURSE PRACTITIONER
Payer: OTHER MISCELLANEOUS

## 2023-07-18 VITALS
DIASTOLIC BLOOD PRESSURE: 90 MMHG | HEART RATE: 69 BPM | OXYGEN SATURATION: 99 % | TEMPERATURE: 97.7 F | SYSTOLIC BLOOD PRESSURE: 140 MMHG | RESPIRATION RATE: 16 BRPM

## 2023-07-18 DIAGNOSIS — M79.89 SWELLING OF RIGHT HAND: ICD-10-CM

## 2023-07-18 LAB
ANION GAP SERPL CALCULATED.3IONS-SCNC: 12 MMOL/L (ref 7–15)
BASOPHILS # BLD AUTO: 0.1 10E3/UL (ref 0–0.2)
BASOPHILS NFR BLD AUTO: 1 %
BUN SERPL-MCNC: 15.5 MG/DL (ref 8–23)
CALCIUM SERPL-MCNC: 9.2 MG/DL (ref 8.8–10.2)
CHLORIDE SERPL-SCNC: 103 MMOL/L (ref 98–107)
CREAT SERPL-MCNC: 0.89 MG/DL (ref 0.67–1.17)
CRP SERPL-MCNC: 8.54 MG/L
DEPRECATED HCO3 PLAS-SCNC: 24 MMOL/L (ref 22–29)
EOSINOPHIL # BLD AUTO: 0.2 10E3/UL (ref 0–0.7)
EOSINOPHIL NFR BLD AUTO: 2 %
ERYTHROCYTE [DISTWIDTH] IN BLOOD BY AUTOMATED COUNT: 13 % (ref 10–15)
ERYTHROCYTE [SEDIMENTATION RATE] IN BLOOD BY WESTERGREN METHOD: 12 MM/HR (ref 0–20)
GFR SERPL CREATININE-BSD FRML MDRD: >90 ML/MIN/1.73M2
GLUCOSE SERPL-MCNC: 97 MG/DL (ref 70–99)
HCT VFR BLD AUTO: 43.7 % (ref 40–53)
HGB BLD-MCNC: 15 G/DL (ref 13.3–17.7)
HOLD SPECIMEN: NORMAL
IMM GRANULOCYTES # BLD: 0 10E3/UL
IMM GRANULOCYTES NFR BLD: 0 %
LYMPHOCYTES # BLD AUTO: 3 10E3/UL (ref 0.8–5.3)
LYMPHOCYTES NFR BLD AUTO: 29 %
MCH RBC QN AUTO: 32.1 PG (ref 26.5–33)
MCHC RBC AUTO-ENTMCNC: 34.3 G/DL (ref 31.5–36.5)
MCV RBC AUTO: 94 FL (ref 78–100)
MONOCYTES # BLD AUTO: 0.8 10E3/UL (ref 0–1.3)
MONOCYTES NFR BLD AUTO: 8 %
NEUTROPHILS # BLD AUTO: 6.3 10E3/UL (ref 1.6–8.3)
NEUTROPHILS NFR BLD AUTO: 60 %
NRBC # BLD AUTO: 0 10E3/UL
NRBC BLD AUTO-RTO: 0 /100
PLATELET # BLD AUTO: 307 10E3/UL (ref 150–450)
POTASSIUM SERPL-SCNC: 4 MMOL/L (ref 3.4–5.3)
RBC # BLD AUTO: 4.67 10E6/UL (ref 4.4–5.9)
SODIUM SERPL-SCNC: 139 MMOL/L (ref 136–145)
URATE SERPL-MCNC: 6.2 MG/DL (ref 3.4–7)
WBC # BLD AUTO: 10.4 10E3/UL (ref 4–11)

## 2023-07-18 PROCEDURE — 99284 EMERGENCY DEPT VISIT MOD MDM: CPT | Performed by: NURSE PRACTITIONER

## 2023-07-18 PROCEDURE — 96374 THER/PROPH/DIAG INJ IV PUSH: CPT

## 2023-07-18 PROCEDURE — 86140 C-REACTIVE PROTEIN: CPT | Performed by: NURSE PRACTITIONER

## 2023-07-18 PROCEDURE — 250N000012 HC RX MED GY IP 250 OP 636 PS 637: Performed by: NURSE PRACTITIONER

## 2023-07-18 PROCEDURE — 99285 EMERGENCY DEPT VISIT HI MDM: CPT | Mod: 25

## 2023-07-18 PROCEDURE — 84550 ASSAY OF BLOOD/URIC ACID: CPT | Performed by: NURSE PRACTITIONER

## 2023-07-18 PROCEDURE — 250N000011 HC RX IP 250 OP 636: Mod: JZ | Performed by: NURSE PRACTITIONER

## 2023-07-18 PROCEDURE — 36415 COLL VENOUS BLD VENIPUNCTURE: CPT | Performed by: NURSE PRACTITIONER

## 2023-07-18 PROCEDURE — 80048 BASIC METABOLIC PNL TOTAL CA: CPT | Performed by: NURSE PRACTITIONER

## 2023-07-18 PROCEDURE — 93971 EXTREMITY STUDY: CPT | Mod: RT

## 2023-07-18 PROCEDURE — 85025 COMPLETE CBC W/AUTO DIFF WBC: CPT | Performed by: NURSE PRACTITIONER

## 2023-07-18 PROCEDURE — 85652 RBC SED RATE AUTOMATED: CPT | Performed by: NURSE PRACTITIONER

## 2023-07-18 RX ORDER — PREDNISONE 20 MG/1
60 TABLET ORAL DAILY
Qty: 12 TABLET | Refills: 0 | Status: SHIPPED | OUTPATIENT
Start: 2023-07-19 | End: 2023-07-23

## 2023-07-18 RX ORDER — PREDNISONE 20 MG/1
60 TABLET ORAL ONCE
Status: COMPLETED | OUTPATIENT
Start: 2023-07-18 | End: 2023-07-18

## 2023-07-18 RX ORDER — KETOROLAC TROMETHAMINE 15 MG/ML
15 INJECTION, SOLUTION INTRAMUSCULAR; INTRAVENOUS ONCE
Status: COMPLETED | OUTPATIENT
Start: 2023-07-18 | End: 2023-07-18

## 2023-07-18 RX ADMIN — PREDNISONE 60 MG: 20 TABLET ORAL at 15:26

## 2023-07-18 RX ADMIN — KETOROLAC TROMETHAMINE 15 MG: 15 INJECTION, SOLUTION INTRAMUSCULAR; INTRAVENOUS at 14:30

## 2023-07-18 ASSESSMENT — ENCOUNTER SYMPTOMS
PSYCHIATRIC NEGATIVE: 1
CARDIOVASCULAR NEGATIVE: 1
NEUROLOGICAL NEGATIVE: 1
EYES NEGATIVE: 1
GASTROINTESTINAL NEGATIVE: 1
ALLERGIC/IMMUNOLOGIC NEGATIVE: 1
MUSCULOSKELETAL NEGATIVE: 1
HEMATOLOGIC/LYMPHATIC NEGATIVE: 1
RESPIRATORY NEGATIVE: 1
ENDOCRINE NEGATIVE: 1
CONSTITUTIONAL NEGATIVE: 1

## 2023-07-18 ASSESSMENT — ACTIVITIES OF DAILY LIVING (ADL)
ADLS_ACUITY_SCORE: 35
ADLS_ACUITY_SCORE: 35

## 2023-07-18 NOTE — ED PROVIDER NOTES
History     Chief Complaint   Patient presents with     Cellulitis     HPI   Alber Jernigan is a 63 year old individual comes in for complaints of right hand and arm swelling.   Patient states has history of cellulitis in the right upper extremity.  States that he was hospitalized for 1 week for this.  Just finished oral antibiotics 1 week ago.  Now is starting to have swelling in the hand that is starting to go up the arm again.  States area is painful.  Patient states that this is how the cellulitis started the last time and was advised by his PCP to come in for evaluation.   Patient denies fever or chills.  Denies any chest pain or shortness of breath.  Denies any in denies any injury that precipitated this.  Denies any.  This.  Denies any paresthesias or loss of range of motion.  Does have pain with movement and with any palpation of the any palpation of the affected area.    Allergies:  Allergies   Allergen Reactions     Oxycodone      Bradycardia Syncope       Problem List:    Patient Active Problem List    Diagnosis Date Noted     Crushing injury of right leg, initial encounter 01/22/2020     Priority: Medium        Past Medical History:    No past medical history on file.    Past Surgical History:    No past surgical history on file.    Family History:    No family history on file.    Social History:  Marital Status:   [2]  Social History     Tobacco Use     Smoking status: Every Day     Packs/day: 1.00     Types: Cigarettes     Smokeless tobacco: Never   Substance Use Topics     Alcohol use: Yes     Comment: daily     Drug use: Never        Medications:    ibuprofen (ADVIL/MOTRIN) 200 MG tablet  [START ON 7/19/2023] predniSONE (DELTASONE) 20 MG tablet  senna-docusate (SENOKOT-S/PERICOLACE) 8.6-50 MG tablet          Review of Systems   Constitutional: Negative.    HENT: Negative.    Eyes: Negative.    Respiratory: Negative.    Cardiovascular: Negative.    Gastrointestinal: Negative.     Endocrine: Negative.    Genitourinary: Negative.    Musculoskeletal: Negative.         Swelling and pain in right hand and wrist.    Skin: Negative.    Allergic/Immunologic: Negative.    Neurological: Negative.    Hematological: Negative.    Psychiatric/Behavioral: Negative.        Physical Exam   BP: 154/87  Pulse: 80  Temp: 97.7  F (36.5  C)  Resp: 16  SpO2: 97 %      Physical Exam  GENERAL APPEARANCE:  The patient is a 63 year old well-developed, well-nourished individual in no acute distress that appears as stated age.  NECK:  Supple.  Trachea is midline.  LUNGS:  Breathing is easy.  Breath sounds are equal and clear bilaterally.  No wheezes, rhonchi, or rales.  HEART:  Regular rate and rhythm with normal S1 and S2.  No murmurs, gallops, or rubs.  EXTREMITIES: Swelling present to right hand to right hand and wrist.   Radial pulses are 2+ to the to the right upper extremity.  MUSCULOSKELETAL: No crepitus or deformities in right upper extremity.  Pain out of proportion tenderness to palpation.  Range of motion intact range of motion intact to all digits, wrist, elbow, shoulder of right upper extremity.  NEUROLOGIC:  No focal sensory or motor deficits are noted.  Gait is normal.  Cranial nerves II through XII are intact.  Deep tendon reflexes are intact.  PSYCHIATRIC:  The patient is awake, alert, and oriented x4.  Recent and remote memory is intact.  Appropriate mood and affect.  Calm and cooperative with history and physical exam.  SKIN:  Warm, dry, and well perfused.  Good turgor.  No lesions, nodules, or rashes are noted.  No bruising noted.  No appreciable redness.  No increased warmth noted to palpation.  LYMPHATICS:  No supraclavicular or axillary adenopathy is noted.    Comment: Discrepancies between my note and notes on behalf of the nursing team or other care providers are secondary to my findings reflecting my physical examination and questioning of the patient.  Any conflicting information provided is  not in line with my examination of the patient.      ED Course              ED Course as of 07/18/23 1609   Tue Jul 18, 2023   1255 In to see patient and history/physical completed.    1300 Labs and US to r/o DVT ordered   1408 No DVT of right upper extremity found.   1518 Discussed case with orthopedist Dr. Diamond at [facility] who recommended steroids and following up in clinic.  Patient is scheduled for appointment on 7/20/2023.  In the interim we will place patient on prednisone 60 mg daily.          Results for orders placed or performed during the hospital encounter of 07/18/23 (from the past 24 hour(s))   [place] Draw    Narrative    The following orders were created for panel order [place] Draw.  Procedure                               Abnormality         Status                     ---------                               -----------         ------                     Extra Blue Top Tube[[id number]]                              Final result               Extra Red Top Tube[[id number]]                               Final result               Extra Green Top (Lithium...[[id number]]                                                 Extra Purple Top Tube[[id number]]                                                       Extra Heparinized Syringe[[id number]]                        Final result                 Please view results for these tests on the individual orders.   CBC with platelets differential    Narrative    The following orders were created for panel order CBC with platelets differential.  Procedure                               Abnormality         Status                     ---------                               -----------         ------                     CBC with platelets and d...[[id number]]                      Final result                 Please view results for these tests on the individual orders.   Basic metabolic panel   Result Value Ref [place]    Sodium 139 136 - 145 mmol/L    Potassium  4.0 3.4 - 5.3 mmol/L    Chloride 103 98 - 107 mmol/L    Carbon Dioxide (CO2) 24 22 - 29 mmol/L    Anion Gap 12 7 - 15 mmol/L    Urea Nitrogen 15.5 8.0 - 23.0 mg/dL    Creatinine 0.89 0.67 - 1.17 mg/dL    Calcium 9.2 8.8 - 10.2 mg/dL    Glucose 97 70 - 99 mg/dL    GFR Estimate >90 >60 mL/min/1.73m2   Extra Blue Top Tube   Result Value Ref [place]    Hold Specimen JIC    Extra Red Top Tube   Result Value Ref [place]    Hold Specimen JIC    Extra Heparinized Syringe   Result Value Ref [place]    Hold Specimen JIC    CBC with platelets and differential   Result Value Ref [place]    WBC Count 10.4 4.0 - 11.0 10e3/uL    RBC Count 4.67 4.40 - 5.90 10e6/uL    Hemoglobin 15.0 13.3 - 17.7 g/dL    Hematocrit 43.7 40.0 - 53.0 %    MCV 94 78 - 100 fL    MCH 32.1 26.5 - 33.0 pg    MCHC 34.3 31.5 - 36.5 g/dL    RDW 13.0 10.0 - 15.0 %    Platelet Count 307 150 - 450 10e3/uL    % Neutrophils 60 %    % Lymphocytes 29 %    % Monocytes 8 %    % Eosinophils 2 %    % Basophils 1 %    % Immature Granulocytes 0 %    NRBCs per 100 WBC 0 <1 /100    Absolute Neutrophils 6.3 1.6 - 8.3 10e3/uL    Absolute Lymphocytes 3.0 0.8 - 5.3 10e3/uL    Absolute Monocytes 0.8 0.0 - 1.3 10e3/uL    Absolute Eosinophils 0.2 0.0 - 0.7 10e3/uL    Absolute Basophils 0.1 0.0 - 0.2 10e3/uL    Absolute Immature Granulocytes 0.0 <=0.4 10e3/uL    Absolute NRBCs 0.0 10e3/uL   Uric acid   Result Value Ref [place]    Uric Acid 6.2 3.4 - 7.0 mg/dL   CRP inflammation   Result Value Ref [place]    CRP Inflammation 8.54 (H) <5.00 mg/L   Erythrocyte sedimentation rate auto   Result Value Ref [place]    Erythrocyte Sedimentation Rate 12 0 - 20 mm/hr   US Upper Extremity Venous Duplex Right    Narrative    PROCEDURE: US UPPER EXTREMITY VENOUS DUPLEX RIGHT 7/18/2023 1:55 PM    HISTORY: Swelling and pain    COMPARISONS: None.    TECHNIQUE: Yes ultrasound of the right upper extremity.    FINDINGS: Normal compressibility and respiratory phasicity is seen in  the cephalic basilic brachial and  axillary veins. Normal flow is  identified in the subclavian and jugular veins.         Impression    IMPRESSION: No evidence of venous thrombosis in the right upper  extremity    KAMALA AQUINO MD         SYSTEM ID:  [id number]       Medications   ketorolac (TORADOL) injection 15 mg (15 mg Intravenous $Given 7/18/23 1430)   predniSONE (DELTASONE) tablet 60 mg (60 mg Oral $Given 7/18/23 1526)       Assessments & Plan (with Medical Decision Making)     I have reviewed the nursing notes.    I have reviewed the findings, diagnosis, plan and need for follow up with the patient.      Summary:  Patient presents to the ER today right hand and wrist swelling with pain.  Potential diagnosis which have been considered and evaluated include DVT, cellulitis, trauma, as well as others. Many of these have been excluded using the various modalities and assessment as noted on the chart. At the present time, the diagnosis given seems to be the most likely swelling of right hand that is recurrent.  Upon arrival, vitals signs are normal.  The patient is alert and oriented.  CMS intact to right upper extremity.  Does have swelling to the hand and wrist.  No increased warmth noted to palpation when compared to the left hand.  No appreciable increased redness noted when compared to the left.  Does have tenderness to palpation.  No crepitus or deformities.  With patient having history of  cellulitis, lab work was obtained.  This showed WBC of 10.4 with hemoglobin of 15.0.  Electrolytes and renal functions benign.  Uric acid 6.2.  ESR 12 and CRP 8.54.  DVT rule out ultrasound was completed showing no DVT.   Patient apparently was hospitalized at [facility] on 6/25/2023 for cellulitis and tenosynovitis.  MRI was conducted at [facility] showing only cellulitis and possible myositis/fasciitis.  With patient having same symptoms at this time including pain out of proportion and swelling of the digits of the hand and wrist, discussed case with  orthopedist Dr. Diamond.  No further imaging recommended at this time.  Recommends starting steroid therapy and following up with [facility] as scheduled.  Patient given prednisone 60 mg p.o. for this reason.  Patient is scheduled to see [facility] on 7/20/2023.  Discussed this with patient.  Patient in agreement with discharging on steroid therapy.  Patient states will do acetaminophen/ibuprofen for pain control.  Advised patient to keep hand elevated.  Return to ER if febrile or worsening.  Otherwise follow-up as directed.  Patient verbalized understanding agrees with plan of care.  Patient discharged home.        Impression and plan discussed with patient. Questions answered, concerns addressed, indications for urgent re-evaluation reviewed, and  given. Patient/Parent/Caregiver agree with treatment plan and have no further questions at this time.  AVS provided at discharge.    This note was created by the Dragon Voice Dictation System. Inadvertent typographical errors, due to software recognition problems, may still exist.        New Prescriptions    PREDNISONE (DELTASONE) 20 MG TABLET    Take 3 tablets (60 mg) by mouth daily for 4 days       Final diagnoses:   Swelling of right hand       7/18/2023   [facility]     Stevo Peck APRN CNP  07/18/23 [id number]

## 2023-07-18 NOTE — DISCHARGE INSTRUCTIONS
Take steroids as directed.    Follow-up with orthopedics as  scheduled.    Pain control:   If your past medical conditions, allergies, current medications, or current status does not prevent you from using acetaminophen and/or ibuprofen, use the following:   Acetaminophen 650-1000 mg every 6 hours as needed for pain in addition to ibuprofen 400-600 mg every 6 hours as needed for pain.  Take these two medications together if wanted.    Remember that these are for AS NEEDED.  If not needed, do not take.          Follow-up with your primary care provider for reevaluation.  Contact your primary care provider if you have any questions or concerns.  Do not hesitate to return to the ER if any new or worsening symptoms.     Please read the attached instructions (if any).  They highlight more specific treatments and interventions for you at home.              Thank you for letting me participate in your care and wish you a fast and uneventful recovery,    Stevo PHAM CNP    Do not hesitate to contact me with questions or concerns.  [email or web address]  [email or web address]

## 2023-07-18 NOTE — ED TRIAGE NOTES
Pt presents with c/o swollen right arm onset this morning. Pt reports history of cellulitis, denies injury. Pt reports being sent to [place] for IV abx, last dose of PO abx 1 week ago.

## 2023-08-30 ENCOUNTER — MEDICAL CORRESPONDENCE (OUTPATIENT)
Dept: MRI IMAGING | Facility: HOSPITAL | Age: 63
End: 2023-08-30

## 2023-09-13 ENCOUNTER — HOSPITAL ENCOUNTER (OUTPATIENT)
Dept: MRI IMAGING | Facility: HOSPITAL | Age: 63
Discharge: HOME | End: 2023-09-13
Attending: ORTHOPAEDIC SURGERY | Admitting: ORTHOPAEDIC SURGERY
Payer: OTHER MISCELLANEOUS

## 2023-09-13 DIAGNOSIS — M86.9 OSTEOMYELITIS (H): ICD-10-CM

## 2023-09-13 DIAGNOSIS — B99.9 INFECTION: ICD-10-CM

## 2023-09-13 PROCEDURE — 255N000002 HC RX 255 OP 636: Mod: JZ | Performed by: RADIOLOGY

## 2023-09-13 PROCEDURE — 73223 MRI JOINT UPR EXTR W/O&W/DYE: CPT | Mod: RT

## 2023-09-13 PROCEDURE — A9585 GADOBUTROL INJECTION: HCPCS | Mod: JZ | Performed by: RADIOLOGY

## 2023-09-13 RX ORDER — GADOBUTROL 604.72 MG/ML
10 INJECTION INTRAVENOUS ONCE
Status: COMPLETED | OUTPATIENT
Start: 2023-09-13 | End: 2023-09-13

## 2023-09-13 RX ADMIN — GADOBUTROL 10 ML: 604.72 INJECTION INTRAVENOUS at 07:58

## 2024-03-22 ENCOUNTER — TRANSFERRED RECORDS (OUTPATIENT)
Dept: HEALTH INFORMATION MANAGEMENT | Facility: CLINIC | Age: 64
End: 2024-03-22

## 2024-03-25 ENCOUNTER — MEDICAL CORRESPONDENCE (OUTPATIENT)
Dept: CT IMAGING | Facility: HOSPITAL | Age: 64
End: 2024-03-25

## 2024-03-28 ENCOUNTER — HOSPITAL ENCOUNTER (OUTPATIENT)
Dept: CT IMAGING | Facility: HOSPITAL | Age: 64
Discharge: HOME | End: 2024-03-28
Attending: NURSE PRACTITIONER | Admitting: NURSE PRACTITIONER
Payer: COMMERCIAL

## 2024-03-28 DIAGNOSIS — F17.210 CIGARETTE SMOKER: ICD-10-CM

## 2024-03-28 PROCEDURE — 71271 CT THORAX LUNG CANCER SCR C-: CPT

## 2025-07-28 ENCOUNTER — APPOINTMENT (OUTPATIENT)
Dept: FAMILY MEDICINE | Facility: OTHER | Age: 65
End: 2025-07-28
Attending: NURSE PRACTITIONER

## 2025-07-28 ENCOUNTER — HOSPITAL ENCOUNTER (OUTPATIENT)
Dept: GENERAL RADIOLOGY | Facility: OTHER | Age: 65
Discharge: HOME | End: 2025-07-28
Attending: NURSE PRACTITIONER

## 2025-07-28 DIAGNOSIS — R52 PAIN: ICD-10-CM

## 2025-07-28 DIAGNOSIS — M25.561 RIGHT KNEE PAIN: ICD-10-CM

## 2025-07-28 PROCEDURE — 73562 X-RAY EXAM OF KNEE 3: CPT | Mod: RT | Performed by: NURSE PRACTITIONER

## 2025-07-28 PROCEDURE — 73560 X-RAY EXAM OF KNEE 1 OR 2: CPT | Mod: RT

## 2025-07-28 PROCEDURE — 73560 X-RAY EXAM OF KNEE 1 OR 2: CPT | Mod: 26 | Performed by: RADIOLOGY

## 2025-07-28 PROCEDURE — 72170 X-RAY EXAM OF PELVIS: CPT | Performed by: NURSE PRACTITIONER

## 2025-07-28 PROCEDURE — 72170 X-RAY EXAM OF PELVIS: CPT | Mod: 26 | Performed by: RADIOLOGY

## 2025-07-28 PROCEDURE — 72170 X-RAY EXAM OF PELVIS: CPT

## 2025-07-28 PROCEDURE — 99499 UNLISTED E&M SERVICE: CPT | Performed by: NURSE PRACTITIONER

## 2025-08-04 ENCOUNTER — HOSPITAL ENCOUNTER (EMERGENCY)
Facility: HOSPITAL | Age: 65
Discharge: HOME | End: 2025-08-04
Attending: NURSE PRACTITIONER
Payer: COMMERCIAL

## 2025-08-04 ENCOUNTER — APPOINTMENT (OUTPATIENT)
Dept: GENERAL RADIOLOGY | Facility: HOSPITAL | Age: 65
End: 2025-08-04
Attending: NURSE PRACTITIONER
Payer: COMMERCIAL

## 2025-08-04 VITALS
RESPIRATION RATE: 18 BRPM | TEMPERATURE: 97.4 F | HEART RATE: 88 BPM | SYSTOLIC BLOOD PRESSURE: 181 MMHG | OXYGEN SATURATION: 97 % | DIASTOLIC BLOOD PRESSURE: 103 MMHG

## 2025-08-04 DIAGNOSIS — M79.672 LEFT FOOT PAIN: Primary | ICD-10-CM

## 2025-08-04 DIAGNOSIS — M79.89 PAIN AND SWELLING OF TOE OF LEFT FOOT: ICD-10-CM

## 2025-08-04 DIAGNOSIS — M79.675 PAIN AND SWELLING OF TOE OF LEFT FOOT: ICD-10-CM

## 2025-08-04 LAB
ALBUMIN SERPL BCG-MCNC: 4.3 G/DL (ref 3.5–5.2)
ALP SERPL-CCNC: 78 U/L (ref 40–150)
ALT SERPL W P-5'-P-CCNC: 35 U/L (ref 0–70)
ANION GAP SERPL CALCULATED.3IONS-SCNC: 11 MMOL/L (ref 7–15)
AST SERPL W P-5'-P-CCNC: 31 U/L (ref 0–45)
BASOPHILS # BLD AUTO: 0.1 10E3/UL (ref 0–0.2)
BASOPHILS NFR BLD AUTO: 1 %
BILIRUB SERPL-MCNC: 0.4 MG/DL
BUN SERPL-MCNC: 16.6 MG/DL (ref 8–23)
CALCIUM SERPL-MCNC: 9.7 MG/DL (ref 8.8–10.4)
CHLORIDE SERPL-SCNC: 100 MMOL/L (ref 98–107)
CREAT SERPL-MCNC: 0.83 MG/DL (ref 0.67–1.17)
CRP SERPL-MCNC: 10.86 MG/L
EGFRCR SERPLBLD CKD-EPI 2021: >90 ML/MIN/1.73M2
EOSINOPHIL # BLD AUTO: 0.2 10E3/UL (ref 0–0.7)
EOSINOPHIL NFR BLD AUTO: 3 %
ERYTHROCYTE [DISTWIDTH] IN BLOOD BY AUTOMATED COUNT: 13.1 % (ref 10–15)
ERYTHROCYTE [SEDIMENTATION RATE] IN BLOOD BY WESTERGREN METHOD: 7 MM/HR (ref 0–20)
GLUCOSE SERPL-MCNC: 114 MG/DL (ref 70–99)
HCO3 SERPL-SCNC: 25 MMOL/L (ref 22–29)
HCT VFR BLD AUTO: 47.3 % (ref 40–53)
HGB BLD-MCNC: 16.5 G/DL (ref 13.3–17.7)
IMM GRANULOCYTES # BLD: 0.1 10E3/UL
IMM GRANULOCYTES NFR BLD: 1 %
LYMPHOCYTES # BLD AUTO: 2.3 10E3/UL (ref 0.8–5.3)
LYMPHOCYTES NFR BLD AUTO: 34 %
MCH RBC QN AUTO: 32.4 PG (ref 26.5–33)
MCHC RBC AUTO-ENTMCNC: 34.9 G/DL (ref 31.5–36.5)
MCV RBC AUTO: 93 FL (ref 78–100)
MONOCYTES # BLD AUTO: 0.6 10E3/UL (ref 0–1.3)
MONOCYTES NFR BLD AUTO: 8 %
NEUTROPHILS # BLD AUTO: 3.7 10E3/UL (ref 1.6–8.3)
NEUTROPHILS NFR BLD AUTO: 53 %
NRBC # BLD AUTO: 0 10E3/UL
NRBC BLD AUTO-RTO: 0 /100
PLATELET # BLD AUTO: 246 10E3/UL (ref 150–450)
POTASSIUM SERPL-SCNC: 4.6 MMOL/L (ref 3.4–5.3)
PROT SERPL-MCNC: 7.4 G/DL (ref 6.4–8.3)
RBC # BLD AUTO: 5.09 10E6/UL (ref 4.4–5.9)
SODIUM SERPL-SCNC: 136 MMOL/L (ref 135–145)
URATE SERPL-MCNC: 5.3 MG/DL (ref 3.4–7)
WBC # BLD AUTO: 6.9 10E3/UL (ref 4–11)

## 2025-08-04 PROCEDURE — 84155 ASSAY OF PROTEIN SERUM: CPT | Performed by: NURSE PRACTITIONER

## 2025-08-04 PROCEDURE — 96372 THER/PROPH/DIAG INJ SC/IM: CPT | Performed by: NURSE PRACTITIONER

## 2025-08-04 PROCEDURE — G0463 HOSPITAL OUTPT CLINIC VISIT: HCPCS | Performed by: NURSE PRACTITIONER

## 2025-08-04 PROCEDURE — 36415 COLL VENOUS BLD VENIPUNCTURE: CPT | Performed by: NURSE PRACTITIONER

## 2025-08-04 PROCEDURE — 85652 RBC SED RATE AUTOMATED: CPT | Performed by: NURSE PRACTITIONER

## 2025-08-04 PROCEDURE — G0463 HOSPITAL OUTPT CLINIC VISIT: HCPCS | Mod: 25 | Performed by: NURSE PRACTITIONER

## 2025-08-04 PROCEDURE — 250N000012 HC RX MED GY IP 250 OP 636 PS 637: Performed by: NURSE PRACTITIONER

## 2025-08-04 PROCEDURE — 99214 OFFICE O/P EST MOD 30 MIN: CPT | Performed by: NURSE PRACTITIONER

## 2025-08-04 PROCEDURE — 85004 AUTOMATED DIFF WBC COUNT: CPT | Performed by: NURSE PRACTITIONER

## 2025-08-04 PROCEDURE — 250N000011 HC RX IP 250 OP 636: Performed by: NURSE PRACTITIONER

## 2025-08-04 PROCEDURE — 73630 X-RAY EXAM OF FOOT: CPT | Mod: LT

## 2025-08-04 PROCEDURE — 73630 X-RAY EXAM OF FOOT: CPT | Mod: 26 | Performed by: RADIOLOGY

## 2025-08-04 PROCEDURE — 84550 ASSAY OF BLOOD/URIC ACID: CPT | Performed by: NURSE PRACTITIONER

## 2025-08-04 PROCEDURE — 86140 C-REACTIVE PROTEIN: CPT | Performed by: NURSE PRACTITIONER

## 2025-08-04 RX ORDER — ALLOPURINOL 100 MG/1
100 TABLET ORAL DAILY
Qty: 90 TABLET | Refills: 1 | Status: SHIPPED | OUTPATIENT
Start: 2025-08-04

## 2025-08-04 RX ORDER — KETOROLAC TROMETHAMINE 30 MG/ML
30 INJECTION, SOLUTION INTRAMUSCULAR; INTRAVENOUS ONCE
Status: COMPLETED | OUTPATIENT
Start: 2025-08-04 | End: 2025-08-04

## 2025-08-04 RX ORDER — PREDNISONE 10 MG/1
TABLET ORAL
Qty: 25 TABLET | Refills: 0 | Status: SHIPPED | OUTPATIENT
Start: 2025-08-04 | End: 2025-08-14

## 2025-08-04 RX ORDER — PREDNISONE 20 MG/1
40 TABLET ORAL ONCE
Status: COMPLETED | OUTPATIENT
Start: 2025-08-04 | End: 2025-08-04

## 2025-08-04 RX ADMIN — KETOROLAC TROMETHAMINE 30 MG: 30 INJECTION, SOLUTION INTRAMUSCULAR at 11:36

## 2025-08-04 RX ADMIN — PREDNISONE 40 MG: 20 TABLET ORAL at 11:36

## 2025-08-04 ASSESSMENT — COLUMBIA-SUICIDE SEVERITY RATING SCALE - C-SSRS
1. IN THE PAST MONTH, HAVE YOU WISHED YOU WERE DEAD OR WISHED YOU COULD GO TO SLEEP AND NOT WAKE UP?: NO
6. HAVE YOU EVER DONE ANYTHING, STARTED TO DO ANYTHING, OR PREPARED TO DO ANYTHING TO END YOUR LIFE?: NO
2. HAVE YOU ACTUALLY HAD ANY THOUGHTS OF KILLING YOURSELF IN THE PAST MONTH?: NO

## 2025-08-04 ASSESSMENT — ACTIVITIES OF DAILY LIVING (ADL)
ADLS_ACUITY_SCORE: 43
ADLS_ACUITY_SCORE: 43

## 2025-08-05 LAB — HOLD SPECIMEN: NORMAL

## 2025-08-19 ENCOUNTER — OFFICE VISIT (OUTPATIENT)
Dept: PODIATRY | Facility: OTHER | Age: 65
End: 2025-08-19
Attending: PODIATRIST
Payer: COMMERCIAL

## 2025-08-19 VITALS
HEART RATE: 88 BPM | DIASTOLIC BLOOD PRESSURE: 93 MMHG | SYSTOLIC BLOOD PRESSURE: 144 MMHG | OXYGEN SATURATION: 97 % | TEMPERATURE: 98.1 F

## 2025-08-19 DIAGNOSIS — M25.572 SINUS TARSI SYNDROME OF LEFT ANKLE: ICD-10-CM

## 2025-08-19 DIAGNOSIS — M76.822 POSTERIOR TIBIAL TENDINITIS OF LEFT LOWER EXTREMITY: ICD-10-CM

## 2025-08-19 DIAGNOSIS — M76.72 PERONEAL TENDINITIS OF LEFT LOWER EXTREMITY: ICD-10-CM

## 2025-08-19 DIAGNOSIS — M10.072 ACUTE IDIOPATHIC GOUT OF LEFT FOOT: Primary | ICD-10-CM

## 2025-08-19 PROCEDURE — G0463 HOSPITAL OUTPT CLINIC VISIT: HCPCS

## 2025-08-19 RX ORDER — PREDNISONE 20 MG/1
TABLET ORAL
Qty: 15 TABLET | Refills: 0 | Status: SHIPPED | OUTPATIENT
Start: 2025-08-19 | End: 2025-08-31

## 2025-08-19 ASSESSMENT — PAIN SCALES - GENERAL: PAINLEVEL_OUTOF10: SEVERE PAIN (9)

## 2025-09-04 ENCOUNTER — OFFICE VISIT (OUTPATIENT)
Dept: PODIATRY | Facility: OTHER | Age: 65
End: 2025-09-04
Attending: PODIATRIST
Payer: COMMERCIAL

## 2025-09-04 VITALS
DIASTOLIC BLOOD PRESSURE: 102 MMHG | OXYGEN SATURATION: 94 % | SYSTOLIC BLOOD PRESSURE: 170 MMHG | WEIGHT: 227 LBS | HEART RATE: 73 BPM | BODY MASS INDEX: 30.75 KG/M2 | TEMPERATURE: 97.6 F | HEIGHT: 72 IN

## 2025-09-04 DIAGNOSIS — M10.072 ACUTE IDIOPATHIC GOUT OF LEFT FOOT: Primary | ICD-10-CM

## 2025-09-04 PROCEDURE — G0463 HOSPITAL OUTPT CLINIC VISIT: HCPCS

## 2025-09-04 ASSESSMENT — PAIN SCALES - GENERAL: PAINLEVEL_OUTOF10: MODERATE PAIN (4)
